# Patient Record
Sex: FEMALE | Race: WHITE | NOT HISPANIC OR LATINO | Employment: FULL TIME | URBAN - METROPOLITAN AREA
[De-identification: names, ages, dates, MRNs, and addresses within clinical notes are randomized per-mention and may not be internally consistent; named-entity substitution may affect disease eponyms.]

---

## 2023-06-16 PROBLEM — B35.1 ONYCHOMYCOSIS: Status: ACTIVE | Noted: 2023-06-16

## 2023-06-16 PROBLEM — S82.143A FRACTURE, TIBIAL PLATEAU: Status: ACTIVE | Noted: 2023-06-16

## 2023-06-16 PROBLEM — B34.9 VIRAL SYNDROME: Status: ACTIVE | Noted: 2023-06-16

## 2023-06-16 PROBLEM — M17.10 ARTHRITIS OF KNEE: Status: ACTIVE | Noted: 2023-06-16

## 2023-06-16 PROBLEM — M25.569 KNEE PAIN: Status: ACTIVE | Noted: 2023-06-16

## 2023-06-16 PROBLEM — K86.89 PANCREATIC MASS (HHS-HCC): Status: ACTIVE | Noted: 2023-06-16

## 2023-06-16 PROBLEM — N95.2 ATROPHIC VAGINITIS: Status: ACTIVE | Noted: 2023-06-16

## 2023-06-16 PROBLEM — R35.0 URINARY FREQUENCY: Status: ACTIVE | Noted: 2023-06-16

## 2023-06-16 PROBLEM — R93.89 ENDOMETRIAL THICKENING ON ULTRASOUND: Status: ACTIVE | Noted: 2023-06-16

## 2023-06-16 PROBLEM — J20.9 ACUTE BRONCHITIS: Status: RESOLVED | Noted: 2023-06-16 | Resolved: 2023-06-16

## 2023-06-16 PROBLEM — R05.9 COUGH: Status: ACTIVE | Noted: 2023-06-16

## 2023-06-16 PROBLEM — M70.70 BURSITIS OF HIP: Status: ACTIVE | Noted: 2023-06-16

## 2023-06-16 PROBLEM — F32.A DEPRESSION: Status: ACTIVE | Noted: 2023-06-16

## 2023-06-16 PROBLEM — G47.00 INSOMNIA: Status: ACTIVE | Noted: 2023-06-16

## 2023-06-16 PROBLEM — R10.9 ABDOMINAL PAIN: Status: ACTIVE | Noted: 2023-06-16

## 2023-06-16 PROBLEM — M25.512 SHOULDER PAIN, LEFT: Status: ACTIVE | Noted: 2023-06-16

## 2023-06-16 PROBLEM — J02.9 PHARYNGITIS, ACUTE: Status: RESOLVED | Noted: 2023-06-16 | Resolved: 2023-06-16

## 2023-06-16 PROBLEM — N39.0 ACUTE UTI: Status: RESOLVED | Noted: 2023-06-16 | Resolved: 2023-06-16

## 2023-06-16 PROBLEM — J06.9 ACUTE URI: Status: RESOLVED | Noted: 2023-06-16 | Resolved: 2023-06-16

## 2023-06-16 PROBLEM — H10.30 ACUTE CONJUNCTIVITIS: Status: RESOLVED | Noted: 2023-06-16 | Resolved: 2023-06-16

## 2023-06-16 PROBLEM — F41.9 ANXIETY: Status: ACTIVE | Noted: 2023-06-16

## 2023-06-16 PROBLEM — S83.207A TEAR OF MENISCUS OF LEFT KNEE: Status: ACTIVE | Noted: 2023-06-16

## 2023-06-16 PROBLEM — B35.3 TINEA PEDIS OF BOTH FEET: Status: ACTIVE | Noted: 2023-06-16

## 2023-06-16 RX ORDER — DICLOFENAC SODIUM 75 MG/1
TABLET, DELAYED RELEASE ORAL
COMMUNITY
Start: 2022-04-14 | End: 2023-07-21 | Stop reason: SDUPTHER

## 2023-06-16 RX ORDER — DULOXETIN HYDROCHLORIDE 20 MG/1
1 CAPSULE, DELAYED RELEASE ORAL 2 TIMES DAILY
COMMUNITY
Start: 2022-04-14 | End: 2023-06-19 | Stop reason: WASHOUT

## 2023-06-16 RX ORDER — FLUTICASONE PROPIONATE 50 MCG
2 SPRAY, SUSPENSION (ML) NASAL DAILY
COMMUNITY
Start: 2016-02-15 | End: 2023-06-19 | Stop reason: WASHOUT

## 2023-06-16 RX ORDER — ESCITALOPRAM OXALATE 20 MG/1
1 TABLET ORAL DAILY
COMMUNITY
Start: 2021-03-24 | End: 2023-06-19 | Stop reason: WASHOUT

## 2023-06-16 RX ORDER — CICLOPIROX 80 MG/ML
SOLUTION TOPICAL 2 TIMES DAILY
COMMUNITY
Start: 2017-04-04 | End: 2023-06-19 | Stop reason: WASHOUT

## 2023-06-16 RX ORDER — NAPROXEN 500 MG/1
TABLET ORAL EVERY 12 HOURS
COMMUNITY
Start: 2021-02-10 | End: 2023-06-19 | Stop reason: WASHOUT

## 2023-06-16 RX ORDER — TRAZODONE HYDROCHLORIDE 100 MG/1
1 TABLET ORAL NIGHTLY
COMMUNITY
Start: 2021-03-24 | End: 2023-07-21 | Stop reason: SDUPTHER

## 2023-06-16 RX ORDER — ALBUTEROL SULFATE 90 UG/1
2 AEROSOL, METERED RESPIRATORY (INHALATION) EVERY 4 HOURS PRN
COMMUNITY
Start: 2016-02-15 | End: 2023-06-19 | Stop reason: WASHOUT

## 2023-06-19 ENCOUNTER — OFFICE VISIT (OUTPATIENT)
Dept: PRIMARY CARE | Facility: CLINIC | Age: 63
End: 2023-06-19
Payer: COMMERCIAL

## 2023-06-19 ENCOUNTER — LAB (OUTPATIENT)
Dept: LAB | Facility: LAB | Age: 63
End: 2023-06-19
Payer: COMMERCIAL

## 2023-06-19 VITALS
WEIGHT: 128.4 LBS | HEART RATE: 75 BPM | TEMPERATURE: 95.2 F | HEIGHT: 62 IN | DIASTOLIC BLOOD PRESSURE: 72 MMHG | SYSTOLIC BLOOD PRESSURE: 120 MMHG | OXYGEN SATURATION: 97 % | BODY MASS INDEX: 23.63 KG/M2

## 2023-06-19 DIAGNOSIS — Z23 NEED FOR TETANUS BOOSTER: ICD-10-CM

## 2023-06-19 DIAGNOSIS — E55.9 VITAMIN D DEFICIENCY: ICD-10-CM

## 2023-06-19 DIAGNOSIS — Z78.0 ASYMPTOMATIC MENOPAUSAL STATE: ICD-10-CM

## 2023-06-19 DIAGNOSIS — Z11.59 NEED FOR HEPATITIS C SCREENING TEST: ICD-10-CM

## 2023-06-19 DIAGNOSIS — Z12.31 BREAST CANCER SCREENING BY MAMMOGRAM: ICD-10-CM

## 2023-06-19 DIAGNOSIS — Z11.4 ENCOUNTER FOR SCREENING FOR HIV: ICD-10-CM

## 2023-06-19 DIAGNOSIS — Z00.00 ENCOUNTER FOR ANNUAL PHYSICAL EXAM: Primary | ICD-10-CM

## 2023-06-19 LAB
CALCIDIOL (25 OH VITAMIN D3) (NG/ML) IN SER/PLAS: 53 NG/ML
HEPATITIS C VIRUS AB PRESENCE IN SERUM: NONREACTIVE
HIV 1/ 2 AG/AB SCREEN: NONREACTIVE

## 2023-06-19 PROCEDURE — 36415 COLL VENOUS BLD VENIPUNCTURE: CPT

## 2023-06-19 PROCEDURE — 86803 HEPATITIS C AB TEST: CPT

## 2023-06-19 PROCEDURE — 90471 IMMUNIZATION ADMIN: CPT | Performed by: INTERNAL MEDICINE

## 2023-06-19 PROCEDURE — 82306 VITAMIN D 25 HYDROXY: CPT

## 2023-06-19 PROCEDURE — 87389 HIV-1 AG W/HIV-1&-2 AB AG IA: CPT

## 2023-06-19 PROCEDURE — 99396 PREV VISIT EST AGE 40-64: CPT | Performed by: INTERNAL MEDICINE

## 2023-06-19 PROCEDURE — 90715 TDAP VACCINE 7 YRS/> IM: CPT | Performed by: INTERNAL MEDICINE

## 2023-06-19 ASSESSMENT — ENCOUNTER SYMPTOMS
HEADACHES: 0
NECK PAIN: 0
ADENOPATHY: 0
POLYPHAGIA: 0
ACTIVITY CHANGE: 0
NAUSEA: 0
VOMITING: 0
ARTHRALGIAS: 1
SHORTNESS OF BREATH: 0
DYSURIA: 0
FEVER: 0
APPETITE CHANGE: 0
EYE PAIN: 0
CHEST TIGHTNESS: 0
UNEXPECTED WEIGHT CHANGE: 0
STRIDOR: 0
BLOOD IN STOOL: 0
WOUND: 0
WEAKNESS: 0
SORE THROAT: 0
NUMBNESS: 0
PALPITATIONS: 0
FACIAL ASYMMETRY: 0
TROUBLE SWALLOWING: 0
FATIGUE: 0
DIARRHEA: 0
PHOTOPHOBIA: 0
FLANK PAIN: 0
ABDOMINAL PAIN: 0
CONFUSION: 0
SPEECH DIFFICULTY: 0
DIZZINESS: 0
POLYDIPSIA: 0
HALLUCINATIONS: 0
BACK PAIN: 0
CONSTIPATION: 0
WHEEZING: 0
SINUS PAIN: 0
SEIZURES: 0
SLEEP DISTURBANCE: 0
NERVOUS/ANXIOUS: 0
MYALGIAS: 0
VOICE CHANGE: 0

## 2023-06-19 ASSESSMENT — PATIENT HEALTH QUESTIONNAIRE - PHQ9
1. LITTLE INTEREST OR PLEASURE IN DOING THINGS: NOT AT ALL
2. FEELING DOWN, DEPRESSED OR HOPELESS: NOT AT ALL
SUM OF ALL RESPONSES TO PHQ9 QUESTIONS 1 AND 2: 0

## 2023-06-19 ASSESSMENT — PAIN SCALES - GENERAL: PAINLEVEL: 4

## 2023-06-19 NOTE — PROGRESS NOTES
"Subjective   Patient ID: Edna Clarke is a 62 y.o. female who presents for Follow-up.    HPI   She is taking Trazodone for Insomnia and its working good and denied for any side effects.   She is also taking Voltaren as needed for pain.    Review of Systems   Constitutional:  Negative for activity change, appetite change, fatigue, fever and unexpected weight change.   HENT:  Negative for dental problem, ear discharge, hearing loss, nosebleeds, postnasal drip, sinus pain, sore throat, trouble swallowing and voice change.    Eyes:  Negative for photophobia, pain and visual disturbance.   Respiratory:  Negative for chest tightness, shortness of breath, wheezing and stridor.    Cardiovascular:  Negative for chest pain, palpitations and leg swelling.   Gastrointestinal:  Negative for abdominal pain, blood in stool, constipation, diarrhea, nausea and vomiting.   Endocrine: Negative for polydipsia, polyphagia and polyuria.   Genitourinary:  Negative for decreased urine volume, dyspareunia, dysuria, flank pain and urgency.   Musculoskeletal:  Positive for arthralgias. Negative for back pain, gait problem, myalgias and neck pain.   Skin:  Negative for rash and wound.   Allergic/Immunologic: Negative for environmental allergies and food allergies.   Neurological:  Negative for dizziness, seizures, syncope, facial asymmetry, speech difficulty, weakness, numbness and headaches.   Hematological:  Negative for adenopathy.   Psychiatric/Behavioral:  Negative for behavioral problems, confusion, hallucinations, sleep disturbance and suicidal ideas. The patient is not nervous/anxious.      Objective   /72   Pulse 75   Temp 35.1 °C (95.2 °F)   Ht 1.575 m (5' 2\")   Wt 58.2 kg (128 lb 6.4 oz)   SpO2 97%   BMI 23.48 kg/m²     Physical Exam  Constitutional:       General: She is not in acute distress.     Appearance: Normal appearance. She is not ill-appearing or toxic-appearing.   HENT:      Head: Normocephalic and " atraumatic.      Nose: Nose normal.   Eyes:      General:         Right eye: No discharge.         Left eye: No discharge.      Conjunctiva/sclera: Conjunctivae normal.      Pupils: Pupils are equal, round, and reactive to light.   Neck:      Vascular: No carotid bruit.   Cardiovascular:      Rate and Rhythm: Normal rate and regular rhythm.      Pulses: Normal pulses.      Heart sounds: Normal heart sounds. No murmur heard.     No gallop.   Pulmonary:      Effort: Pulmonary effort is normal. No respiratory distress.      Breath sounds: Normal breath sounds. No stridor. No wheezing or rales.   Abdominal:      General: Bowel sounds are normal. There is no distension.      Palpations: Abdomen is soft.      Tenderness: There is no abdominal tenderness. There is no right CVA tenderness, left CVA tenderness, guarding or rebound.   Musculoskeletal:         General: No swelling or deformity. Normal range of motion.      Cervical back: Normal range of motion and neck supple. No rigidity or tenderness.      Right lower leg: No edema.      Left lower leg: No edema.   Lymphadenopathy:      Cervical: No cervical adenopathy.   Skin:     General: Skin is warm.      Coloration: Skin is not jaundiced.      Findings: No bruising, erythema or rash.   Neurological:      General: No focal deficit present.      Mental Status: She is alert and oriented to person, place, and time.      Cranial Nerves: No cranial nerve deficit.      Gait: Gait normal.   Psychiatric:         Mood and Affect: Mood normal.         Behavior: Behavior normal.         Thought Content: Thought content normal.         Judgment: Judgment normal.       Assessment/Plan   Problem List Items Addressed This Visit    None  Visit Diagnoses       Encounter for annual physical exam    -  Primary    Vitamin D deficiency        Relevant Orders    Vitamin D, Total    Need for hepatitis C screening test        Relevant Orders    Hepatitis C Antibody    Encounter for screening  for HIV        Relevant Orders    HIV 1/2 Antigen/Antibody Screen with Reflex to Confirmation    Asymptomatic menopausal state        Relevant Orders    XR DEXA bone density    Breast cancer screening by mammogram        Relevant Orders    BI mammo bilateral screening tomosynthesis    Need for tetanus booster        Relevant Orders    Tdap vaccine, age 10 years and older (ADACEL)          RTC in 1 year for regular follow up.

## 2023-07-21 DIAGNOSIS — G47.09 OTHER INSOMNIA: ICD-10-CM

## 2023-07-21 DIAGNOSIS — M17.10 ARTHRITIS OF KNEE: ICD-10-CM

## 2023-07-21 RX ORDER — TRAZODONE HYDROCHLORIDE 100 MG/1
100 TABLET ORAL NIGHTLY
Qty: 30 TABLET | Refills: 1 | Status: SHIPPED | OUTPATIENT
Start: 2023-07-21 | End: 2023-07-25

## 2023-07-21 RX ORDER — DICLOFENAC SODIUM 75 MG/1
75 TABLET, DELAYED RELEASE ORAL 2 TIMES DAILY
Qty: 30 TABLET | Refills: 3 | Status: SHIPPED | OUTPATIENT
Start: 2023-07-21 | End: 2023-07-25 | Stop reason: SDUPTHER

## 2023-07-25 DIAGNOSIS — G47.09 OTHER INSOMNIA: ICD-10-CM

## 2023-07-25 DIAGNOSIS — M17.10 ARTHRITIS OF KNEE: ICD-10-CM

## 2023-07-25 RX ORDER — TRAZODONE HYDROCHLORIDE 100 MG/1
100 TABLET ORAL NIGHTLY
Qty: 90 TABLET | Refills: 3 | Status: SHIPPED | OUTPATIENT
Start: 2023-07-25 | End: 2023-09-22

## 2023-07-25 RX ORDER — DICLOFENAC SODIUM 75 MG/1
75 TABLET, DELAYED RELEASE ORAL 2 TIMES DAILY
Qty: 180 TABLET | Refills: 3 | Status: SHIPPED | OUTPATIENT
Start: 2023-07-25

## 2023-09-22 DIAGNOSIS — G47.09 OTHER INSOMNIA: ICD-10-CM

## 2023-09-22 RX ORDER — TRAZODONE HYDROCHLORIDE 100 MG/1
100 TABLET ORAL NIGHTLY
Qty: 30 TABLET | Refills: 0 | Status: SHIPPED | OUTPATIENT
Start: 2023-09-22 | End: 2023-10-24 | Stop reason: SDUPTHER

## 2023-10-24 DIAGNOSIS — G47.09 OTHER INSOMNIA: ICD-10-CM

## 2023-10-25 RX ORDER — TRAZODONE HYDROCHLORIDE 100 MG/1
100 TABLET ORAL NIGHTLY
Qty: 30 TABLET | Refills: 0 | Status: SHIPPED | OUTPATIENT
Start: 2023-10-25

## 2024-06-24 ENCOUNTER — APPOINTMENT (OUTPATIENT)
Dept: PRIMARY CARE | Facility: CLINIC | Age: 64
End: 2024-06-24
Payer: COMMERCIAL

## 2024-06-24 VITALS
TEMPERATURE: 97.5 F | DIASTOLIC BLOOD PRESSURE: 88 MMHG | HEIGHT: 62 IN | OXYGEN SATURATION: 99 % | HEART RATE: 73 BPM | BODY MASS INDEX: 23 KG/M2 | WEIGHT: 125 LBS | SYSTOLIC BLOOD PRESSURE: 138 MMHG

## 2024-06-24 DIAGNOSIS — Z00.00 ENCOUNTER FOR ANNUAL PHYSICAL EXAM: Primary | ICD-10-CM

## 2024-06-24 DIAGNOSIS — Z12.31 BREAST CANCER SCREENING BY MAMMOGRAM: ICD-10-CM

## 2024-06-24 DIAGNOSIS — G47.09 OTHER INSOMNIA: ICD-10-CM

## 2024-06-24 PROCEDURE — 99396 PREV VISIT EST AGE 40-64: CPT | Performed by: INTERNAL MEDICINE

## 2024-06-24 PROCEDURE — 4004F PT TOBACCO SCREEN RCVD TLK: CPT | Performed by: INTERNAL MEDICINE

## 2024-06-24 RX ORDER — TRAZODONE HYDROCHLORIDE 100 MG/1
100 TABLET ORAL NIGHTLY
Qty: 90 TABLET | Refills: 3 | Status: SHIPPED | OUTPATIENT
Start: 2024-06-24 | End: 2025-06-24

## 2024-06-24 ASSESSMENT — ENCOUNTER SYMPTOMS
BACK PAIN: 0
APPETITE CHANGE: 0
UNEXPECTED WEIGHT CHANGE: 0
SINUS PAIN: 0
DIZZINESS: 0
PALPITATIONS: 0
CHEST TIGHTNESS: 0
NECK PAIN: 0
WHEEZING: 0
SLEEP DISTURBANCE: 0
HALLUCINATIONS: 0
HEMATURIA: 0
DIARRHEA: 0
SHORTNESS OF BREATH: 0
SEIZURES: 0
COUGH: 0
STRIDOR: 0
HEADACHES: 0
COLOR CHANGE: 0
WEAKNESS: 0
BLOOD IN STOOL: 0
FLANK PAIN: 0
EYE PAIN: 0
NUMBNESS: 0
FATIGUE: 0
SPEECH DIFFICULTY: 0
PHOTOPHOBIA: 0
NAUSEA: 0
FEVER: 0
VOICE CHANGE: 0
NERVOUS/ANXIOUS: 0
CONFUSION: 0
POLYPHAGIA: 0
CONSTIPATION: 0
ARTHRALGIAS: 0
TROUBLE SWALLOWING: 0
DYSURIA: 0
POLYDIPSIA: 0
VOMITING: 0
JOINT SWELLING: 0
ACTIVITY CHANGE: 0
ABDOMINAL PAIN: 0
SORE THROAT: 0
WOUND: 0
TREMORS: 0
ADENOPATHY: 0
FACIAL ASYMMETRY: 0
MYALGIAS: 0
FREQUENCY: 0

## 2024-06-24 ASSESSMENT — ANXIETY QUESTIONNAIRES
4. TROUBLE RELAXING: NOT AT ALL
7. FEELING AFRAID AS IF SOMETHING AWFUL MIGHT HAPPEN: NOT AT ALL
1. FEELING NERVOUS, ANXIOUS, OR ON EDGE: NOT AT ALL
3. WORRYING TOO MUCH ABOUT DIFFERENT THINGS: NOT AT ALL
5. BEING SO RESTLESS THAT IT IS HARD TO SIT STILL: NOT AT ALL
GAD7 TOTAL SCORE: 0
2. NOT BEING ABLE TO STOP OR CONTROL WORRYING: NOT AT ALL
6. BECOMING EASILY ANNOYED OR IRRITABLE: NOT AT ALL

## 2024-06-24 NOTE — PROGRESS NOTES
"Subjective   Patient ID: Edna Clarke is a 63 y.o. female who presents for Annual Exam.    HPI   Patient presented to the office for annual physical exam. Trazodone is working well for insomnia and patient need refill on the medicine.  She denied for any symptoms today.    Review of Systems   Constitutional:  Negative for activity change, appetite change, fatigue, fever and unexpected weight change.   HENT:  Negative for dental problem, ear discharge, hearing loss, nosebleeds, postnasal drip, sinus pain, sore throat, trouble swallowing and voice change.    Eyes:  Negative for photophobia, pain and visual disturbance.   Respiratory:  Negative for cough, chest tightness, shortness of breath, wheezing and stridor.    Cardiovascular:  Negative for chest pain, palpitations and leg swelling.   Gastrointestinal:  Negative for abdominal pain, blood in stool, constipation, diarrhea, nausea and vomiting.   Endocrine: Negative for polydipsia, polyphagia and polyuria.   Genitourinary:  Negative for decreased urine volume, dyspareunia, dysuria, flank pain, frequency, hematuria and urgency.   Musculoskeletal:  Negative for arthralgias, back pain, gait problem, joint swelling, myalgias and neck pain.   Skin:  Negative for color change, rash and wound.   Allergic/Immunologic: Negative for environmental allergies and food allergies.   Neurological:  Negative for dizziness, tremors, seizures, syncope, facial asymmetry, speech difficulty, weakness, numbness and headaches.   Hematological:  Negative for adenopathy.   Psychiatric/Behavioral:  Negative for behavioral problems, confusion, hallucinations, self-injury, sleep disturbance and suicidal ideas. The patient is not nervous/anxious.      Objective   /88   Pulse 73   Temp 36.4 °C (97.5 °F)   Ht 1.575 m (5' 2\")   Wt 56.7 kg (125 lb)   SpO2 99%   BMI 22.86 kg/m²     Physical Exam  Vitals and nursing note reviewed.   Constitutional:       General: She is not in " acute distress.     Appearance: Normal appearance. She is not ill-appearing or toxic-appearing.   HENT:      Head: Normocephalic and atraumatic.      Nose: Nose normal.   Eyes:      General:         Right eye: No discharge.         Left eye: No discharge.      Extraocular Movements: Extraocular movements intact.      Conjunctiva/sclera: Conjunctivae normal.      Pupils: Pupils are equal, round, and reactive to light.   Cardiovascular:      Rate and Rhythm: Normal rate and regular rhythm.      Pulses: Normal pulses.      Heart sounds: Normal heart sounds. No murmur heard.     No gallop.   Pulmonary:      Effort: Pulmonary effort is normal. No respiratory distress.      Breath sounds: Normal breath sounds. No stridor. No wheezing or rales.   Abdominal:      General: Bowel sounds are normal. There is no distension.      Palpations: Abdomen is soft.      Tenderness: There is no abdominal tenderness. There is no right CVA tenderness, left CVA tenderness, guarding or rebound.   Musculoskeletal:         General: No swelling or deformity. Normal range of motion.      Cervical back: Normal range of motion and neck supple. No rigidity or tenderness.      Right lower leg: No edema.      Left lower leg: No edema.   Skin:     General: Skin is warm.      Coloration: Skin is not jaundiced.      Findings: No bruising, erythema or rash.   Neurological:      General: No focal deficit present.      Mental Status: She is alert and oriented to person, place, and time.      Cranial Nerves: No cranial nerve deficit.      Gait: Gait normal.   Psychiatric:         Mood and Affect: Mood normal.         Behavior: Behavior normal.         Thought Content: Thought content normal.         Judgment: Judgment normal.       Assessment/Plan   Problem List Items Addressed This Visit          Sleep    Insomnia    Relevant Medications    traZODone (Desyrel) 100 mg tablet     Other Visit Diagnoses       Encounter for annual physical exam    -  Primary     Breast cancer screening by mammogram        Relevant Orders    BI mammo bilateral screening tomosynthesis

## 2024-07-31 ENCOUNTER — HOSPITAL ENCOUNTER (OUTPATIENT)
Dept: RADIOLOGY | Facility: HOSPITAL | Age: 64
Discharge: HOME | End: 2024-07-31
Payer: COMMERCIAL

## 2024-07-31 VITALS — BODY MASS INDEX: 21.44 KG/M2 | HEIGHT: 63 IN | WEIGHT: 121 LBS

## 2024-07-31 DIAGNOSIS — Z12.31 BREAST CANCER SCREENING BY MAMMOGRAM: ICD-10-CM

## 2024-07-31 PROCEDURE — 77067 SCR MAMMO BI INCL CAD: CPT | Performed by: RADIOLOGY

## 2024-07-31 PROCEDURE — 77063 BREAST TOMOSYNTHESIS BI: CPT | Performed by: RADIOLOGY

## 2024-07-31 PROCEDURE — 77067 SCR MAMMO BI INCL CAD: CPT

## 2024-08-07 ENCOUNTER — APPOINTMENT (OUTPATIENT)
Dept: RADIOLOGY | Facility: HOSPITAL | Age: 64
End: 2024-08-07
Payer: COMMERCIAL

## 2024-08-10 DIAGNOSIS — M17.10 ARTHRITIS OF KNEE: ICD-10-CM

## 2024-08-13 RX ORDER — DICLOFENAC SODIUM 75 MG/1
75 TABLET, DELAYED RELEASE ORAL 2 TIMES DAILY
Qty: 180 TABLET | Refills: 0 | Status: SHIPPED | OUTPATIENT
Start: 2024-08-13 | End: 2024-08-14

## 2024-08-14 DIAGNOSIS — M17.10 ARTHRITIS OF KNEE: ICD-10-CM

## 2024-08-14 RX ORDER — DICLOFENAC SODIUM 75 MG/1
75 TABLET, DELAYED RELEASE ORAL 2 TIMES DAILY
Qty: 180 TABLET | Refills: 0 | Status: SHIPPED | OUTPATIENT
Start: 2024-08-14

## 2024-08-15 ENCOUNTER — APPOINTMENT (OUTPATIENT)
Dept: RADIOLOGY | Facility: HOSPITAL | Age: 64
End: 2024-08-15
Payer: COMMERCIAL

## 2024-08-19 ENCOUNTER — HOSPITAL ENCOUNTER (OUTPATIENT)
Dept: RADIOLOGY | Facility: CLINIC | Age: 64
Discharge: HOME | End: 2024-08-19
Payer: COMMERCIAL

## 2024-08-19 DIAGNOSIS — S80.11XA CONTUSION OF KNEE AND LOWER LEG, RIGHT, INITIAL ENCOUNTER: ICD-10-CM

## 2024-08-19 DIAGNOSIS — S80.01XA CONTUSION OF KNEE AND LOWER LEG, RIGHT, INITIAL ENCOUNTER: ICD-10-CM

## 2024-08-19 PROCEDURE — 73564 X-RAY EXAM KNEE 4 OR MORE: CPT | Mod: RIGHT SIDE | Performed by: RADIOLOGY

## 2024-08-19 PROCEDURE — 73564 X-RAY EXAM KNEE 4 OR MORE: CPT | Mod: RT

## 2024-08-22 ENCOUNTER — OFFICE VISIT (OUTPATIENT)
Dept: PRIMARY CARE | Facility: CLINIC | Age: 64
End: 2024-08-22
Payer: COMMERCIAL

## 2024-08-22 VITALS
HEART RATE: 91 BPM | OXYGEN SATURATION: 98 % | SYSTOLIC BLOOD PRESSURE: 104 MMHG | DIASTOLIC BLOOD PRESSURE: 68 MMHG | WEIGHT: 121 LBS | HEIGHT: 62 IN | TEMPERATURE: 98.2 F | BODY MASS INDEX: 22.26 KG/M2

## 2024-08-22 DIAGNOSIS — M89.50 OSTEOLYTIC LESION: Primary | ICD-10-CM

## 2024-08-22 DIAGNOSIS — M25.561 ACUTE PAIN OF RIGHT KNEE: ICD-10-CM

## 2024-08-22 PROCEDURE — 99213 OFFICE O/P EST LOW 20 MIN: CPT | Performed by: INTERNAL MEDICINE

## 2024-08-22 PROCEDURE — 3008F BODY MASS INDEX DOCD: CPT | Performed by: INTERNAL MEDICINE

## 2024-08-22 RX ORDER — TRAMADOL HYDROCHLORIDE 50 MG/1
50 TABLET ORAL EVERY 8 HOURS
Qty: 15 TABLET | Refills: 0 | Status: SHIPPED | OUTPATIENT
Start: 2024-08-22 | End: 2024-08-27

## 2024-08-22 ASSESSMENT — ENCOUNTER SYMPTOMS
VOMITING: 0
HALLUCINATIONS: 0
SORE THROAT: 0
UNEXPECTED WEIGHT CHANGE: 0
CONFUSION: 0
SINUS PAIN: 0
POLYPHAGIA: 0
ARTHRALGIAS: 1
WHEEZING: 0
WOUND: 0
DYSURIA: 0
CONSTIPATION: 0
NUMBNESS: 0
DIZZINESS: 0
NAUSEA: 0
FATIGUE: 0
ADENOPATHY: 0
BACK PAIN: 0
FLANK PAIN: 0
ABDOMINAL PAIN: 0
APPETITE CHANGE: 0
FEVER: 0
BLOOD IN STOOL: 0
HEMATURIA: 0
DIARRHEA: 0
PALPITATIONS: 0
JOINT SWELLING: 1
SLEEP DISTURBANCE: 0
STRIDOR: 0
WEAKNESS: 0
MYALGIAS: 0
SHORTNESS OF BREATH: 0
TREMORS: 0
POLYDIPSIA: 0
COLOR CHANGE: 0
NECK PAIN: 0
COUGH: 0
HEADACHES: 0
NERVOUS/ANXIOUS: 0
ACTIVITY CHANGE: 0

## 2024-08-22 NOTE — PROGRESS NOTES
"Subjective   Patient ID: Edna Clarke is a 63 y.o. female who presents for Leg Injury (Hit leg last Thursday, had xray, needing mri ).    HPI   Patient went to urgent care for acute right knee pain and x ray revealed osteolytic lesion and she is going to need MRI of right knee. She still has pain and is taking NSAIDs which helps little. She was given tramadol and that improved her symptoms most.  She also want to see an orthopedics.    Review of Systems   Constitutional:  Negative for activity change, appetite change, fatigue, fever and unexpected weight change.   HENT:  Negative for dental problem, ear discharge, hearing loss, nosebleeds, postnasal drip, sinus pain and sore throat.    Eyes:  Negative for visual disturbance.   Respiratory:  Negative for cough, shortness of breath, wheezing and stridor.    Cardiovascular:  Negative for chest pain, palpitations and leg swelling.   Gastrointestinal:  Negative for abdominal pain, blood in stool, constipation, diarrhea, nausea and vomiting.   Endocrine: Negative for polydipsia, polyphagia and polyuria.   Genitourinary:  Negative for decreased urine volume, dyspareunia, dysuria, flank pain, hematuria and urgency.   Musculoskeletal:  Positive for arthralgias and joint swelling. Negative for back pain, gait problem, myalgias and neck pain.   Skin:  Negative for color change, rash and wound.   Allergic/Immunologic: Negative for environmental allergies and food allergies.   Neurological:  Negative for dizziness, tremors, weakness, numbness and headaches.   Hematological:  Negative for adenopathy.   Psychiatric/Behavioral:  Negative for behavioral problems, confusion, hallucinations and sleep disturbance. The patient is not nervous/anxious.      Objective   /68   Pulse 91   Temp 36.8 °C (98.2 °F)   Ht 1.575 m (5' 2\")   Wt 54.9 kg (121 lb)   SpO2 98%   BMI 22.13 kg/m²     Physical Exam  Constitutional:       General: She is not in acute distress.     " Appearance: She is not ill-appearing or toxic-appearing.   HENT:      Nose: Nose normal.   Eyes:      General:         Right eye: No discharge.         Left eye: No discharge.      Conjunctiva/sclera: Conjunctivae normal.   Pulmonary:      Effort: Pulmonary effort is normal.   Musculoskeletal:         General: Swelling and tenderness present. No deformity.      Cervical back: Normal range of motion.      Right lower leg: No edema.      Left lower leg: No edema.   Skin:     General: Skin is warm.      Coloration: Skin is not jaundiced.      Findings: No erythema or rash.   Neurological:      General: No focal deficit present.      Mental Status: She is alert and oriented to person, place, and time.   Psychiatric:         Mood and Affect: Mood normal.         Behavior: Behavior normal.       Assessment/Plan   Problem List Items Addressed This Visit       Knee pain    Relevant Medications    traMADol (Ultram) 50 mg tablet    Other Relevant Orders    MR knee right wo IV contrast    Referral to Orthopaedic Surgery     Other Visit Diagnoses       Osteolytic lesion    -  Primary    Relevant Orders    MR knee right wo IV contrast    Referral to Orthopaedic Surgery

## 2024-08-23 ENCOUNTER — APPOINTMENT (OUTPATIENT)
Dept: RADIOLOGY | Facility: HOSPITAL | Age: 64
End: 2024-08-23
Payer: COMMERCIAL

## 2024-08-27 ENCOUNTER — APPOINTMENT (OUTPATIENT)
Dept: ORTHOPEDIC SURGERY | Facility: CLINIC | Age: 64
End: 2024-08-27
Payer: COMMERCIAL

## 2024-08-27 DIAGNOSIS — M25.561 ACUTE PAIN OF RIGHT KNEE: ICD-10-CM

## 2024-08-27 DIAGNOSIS — M89.50 OSTEOLYTIC LESION: ICD-10-CM

## 2024-08-27 PROCEDURE — 4004F PT TOBACCO SCREEN RCVD TLK: CPT | Performed by: STUDENT IN AN ORGANIZED HEALTH CARE EDUCATION/TRAINING PROGRAM

## 2024-08-27 PROCEDURE — 99203 OFFICE O/P NEW LOW 30 MIN: CPT | Performed by: STUDENT IN AN ORGANIZED HEALTH CARE EDUCATION/TRAINING PROGRAM

## 2024-08-27 NOTE — PROGRESS NOTES
Edna Clarke  is a 63 y.o. year-old  female. she  is a new patient to our office and presents with a chief complaint of Right  knee pain.  She notes a few weeks ago she sort of hit the knee on something on the lateral aspect and her knee went in and she has been having pain ever since.  She got an x-ray in the ER concerning for lytic lesion of her distal femur and has since had an MRI.  She reports no fevers or chills she is not necessarily having pain with weightbearing but when she twists she feels pain.      Past Medical, Family, and Social History reviewed   Review of Systems  A complete review of systems was conducted, pertinent only to the HPI noted above.  Constitutional: None  Eyes: No additions to above history  Ears, Nose, Throat: No additions to above history  Cardiovascular: No additions to above history  Respiratory: No additions to above history  GI: No additions to above history  : No additions to above history  Skin/Neuro: No additions to above history  Endocrine/Heme/Lymph: No additions to above history  Immunologic: No additions to above history  Psychiatric: No additions to above history  Musculoskeletal: see above    GEN: Alert and Oriented x 3  Constitutional: Well appearing , in no apparent distress.  Skin: No rashes, erythema, or induration around knee    MUSCULOSKELETAL EXAM:     Right KNEE:  ROM: 0/0/130  Effusion: negative  Alignment: [neutral]      Gait: [normal]  Sensation intact bilaterally sural/saphenous/sp/dp/tibial nerve bilaterally  Motor 5/5 knee flexion/extension/foot DF/PF/EHL/FHL bilaterally  Palpable/symmetric DP and PT pulse bilaterally      PATELLAR/EXTENSOR MECHANISM:   KNEE:  Patellar Crepitus: n  Patellar Compression Pain:n  Patellar Apprehension: [no]  Extensor Mechanism: [intact]  Straight Leg Raise: good      LIGAMENTS:  ACL: Lachmans: [negative]  PCL: [stable]  Valgus at 0 degrees: [stable]  Valgus at 30 degrees: [stable]  Varus at 0 degrees: [stable]  Varus  at 30 degrees: [stable]    MENISCUS EXAM:  Joint Line Tenderness: There is a little bit of tenderness over her medial femoral condyle as well as over lateral proximal tibia  McMurrays: [negative]  Pain with Deep Flexion: [No]    Xrays and MRI independently viewed and interpreted: Lytic lesion of the distal medial femoral condyle on the MRI there does not appear to be a cortical breach but does appear to thin out in some areas also edema in the proximal tibia concerning for a potential nondisplaced tibia fracture there was no MRI report for review    I did review with the patient the lytic lesion that I see in her femur on both the x-rays and the MRI.  I reviewed with her I am not sure what this may represent.  Given the involvement of the distal metaphyseal bone and its abutment to and thinning of the cortical surfaces I have recommended limiting her weightbearing and utilizing crutches to minimize the risk for fracture.  We have referred her to my partner Dr. Lamb who is a musculoskeletal oncologist for further evaluation of this lesion.  All questions were answered she is in agreement with this plan.

## 2024-08-30 ENCOUNTER — APPOINTMENT (OUTPATIENT)
Dept: ORTHOPEDIC SURGERY | Facility: CLINIC | Age: 64
End: 2024-08-30
Payer: COMMERCIAL

## 2024-09-03 ENCOUNTER — DOCUMENTATION (OUTPATIENT)
Dept: ORTHOPEDIC SURGERY | Facility: CLINIC | Age: 64
End: 2024-09-03
Payer: COMMERCIAL

## 2024-09-03 NOTE — PROGRESS NOTES
Reviewed the MRI report.  Confirms a nondisplaced posterior tibial plateau fracture.  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  Again relayed to her that she should be on crutches to give this some time to heal.  She also has a brace at home but it is not fitting her properly.  She is can to come into the office to see if we can get this fitting better.  I would still recommend her following up with Dr. Lamb with regards to her bone lesion.  Questions answered she is in agreement with this plan

## 2024-09-04 ENCOUNTER — APPOINTMENT (OUTPATIENT)
Dept: RADIOLOGY | Facility: HOSPITAL | Age: 64
End: 2024-09-04
Payer: COMMERCIAL

## 2024-09-04 ENCOUNTER — HOSPITAL ENCOUNTER (OUTPATIENT)
Dept: RADIOLOGY | Facility: HOSPITAL | Age: 64
Discharge: HOME | End: 2024-09-04
Payer: COMMERCIAL

## 2024-09-04 DIAGNOSIS — R92.8 ABNORMAL MAMMOGRAM: Primary | ICD-10-CM

## 2024-09-04 DIAGNOSIS — R92.8 ABNORMAL MAMMOGRAM: ICD-10-CM

## 2024-09-04 PROCEDURE — 76642 ULTRASOUND BREAST LIMITED: CPT | Mod: LT

## 2024-09-04 PROCEDURE — 77065 DX MAMMO INCL CAD UNI: CPT | Mod: LEFT SIDE | Performed by: STUDENT IN AN ORGANIZED HEALTH CARE EDUCATION/TRAINING PROGRAM

## 2024-09-04 PROCEDURE — 77061 BREAST TOMOSYNTHESIS UNI: CPT | Mod: LEFT SIDE | Performed by: STUDENT IN AN ORGANIZED HEALTH CARE EDUCATION/TRAINING PROGRAM

## 2024-09-04 PROCEDURE — 76642 ULTRASOUND BREAST LIMITED: CPT | Mod: LEFT SIDE | Performed by: STUDENT IN AN ORGANIZED HEALTH CARE EDUCATION/TRAINING PROGRAM

## 2024-09-04 PROCEDURE — 77061 BREAST TOMOSYNTHESIS UNI: CPT | Mod: LT

## 2024-09-04 PROCEDURE — 76982 USE 1ST TARGET LESION: CPT | Mod: LT

## 2024-09-06 ENCOUNTER — OFFICE VISIT (OUTPATIENT)
Dept: ORTHOPEDIC SURGERY | Facility: CLINIC | Age: 64
End: 2024-09-06
Payer: COMMERCIAL

## 2024-09-06 DIAGNOSIS — S82.141A TIBIAL PLATEAU FRACTURE, RIGHT, CLOSED, INITIAL ENCOUNTER: Primary | ICD-10-CM

## 2024-09-06 NOTE — PROGRESS NOTES
Edna Clarke  is a 63 y.o. year-old  female. she returns for follow-up for her knee.  She has a knee brace that she would like to be make sure it is fitting correctly.  Had a recent MRI.    Past Medical, Family, and Social History reviewed   Review of Systems  A complete review of systems was conducted, pertinent only to the HPI noted above.  Constitutional: None  Eyes: No additions to above history  Ears, Nose, Throat: No additions to above history  Cardiovascular: No additions to above history  Respiratory: No additions to above history  GI: No additions to above history  : No additions to above history  Skin/Neuro: No additions to above history  Endocrine/Heme/Lymph: No additions to above history  Immunologic: No additions to above history  Psychiatric: No additions to above history  Musculoskeletal: see above    GEN: Alert and Oriented x 3  Constitutional: Well appearing , in no apparent distress.  Skin: No rashes, erythema, or induration around knee    MUSCULOSKELETAL EXAM:     Right KNEE:  ROM: 0/0/130  Effusion: negative  Alignment: [neutral]      Gait: [normal]  Sensation intact bilaterally sural/saphenous/sp/dp/tibial nerve bilaterally  Motor 5/5 knee flexion/extension/foot DF/PF/EHL/FHL bilaterally  Palpable/symmetric DP and PT pulse bilaterally      PATELLAR/EXTENSOR MECHANISM:   KNEE:  Patellar Crepitus: n  Patellar Compression Pain:n  Patellar Apprehension: [no]  Extensor Mechanism: [intact]  Straight Leg Raise: good      LIGAMENTS:  ACL: Lachmans: [negative]  PCL: [stable]  Valgus at 0 degrees: [stable]  Valgus at 30 degrees: [stable]  Varus at 0 degrees: [stable]  Varus at 30 degrees: [stable]    MENISCUS EXAM:  Joint Line Tenderness: There is a little bit of tenderness over her medial femoral condyle as well as over lateral proximal tibia  McMurrays: [negative]  Pain with Deep Flexion: [No]    Xrays and MRI independently viewed and interpreted: Lytic lesion of the distal medial femoral  condyle on the MRI there does not appear to be a cortical breach but does appear to thin out in some areas also edema in the proximal tibia concerning for a potential nondisplaced tibia fracture there was no MRI report for review  MRI confirmed a nondisplaced lateral tibial plateau fracture and what was read as a intraosseous lipoma in the medial condyle of the knee    I reviewed with the patient that she does have an acute lateral tibial plateau fracture with minimal displacement this is amenable to nonoperative treatment.  I have stressed with her the importance of being nonweightbearing she has not been very compliant with this.  She does have a hinged knee brace which will protect her.  We will have her follow-up with Dr. Mcallister with regards to the lesion in her femur and the need for any further follow-up.  See her back in 4 weeks with repeat radiographs of the knee.  All questions were answered she is in agreement with this plan.

## 2024-09-09 DIAGNOSIS — M17.10 ARTHRITIS OF KNEE: ICD-10-CM

## 2024-09-09 RX ORDER — DICLOFENAC SODIUM 75 MG/1
75 TABLET, DELAYED RELEASE ORAL 2 TIMES DAILY
Qty: 180 TABLET | Refills: 2 | Status: SHIPPED | OUTPATIENT
Start: 2024-09-09

## 2024-09-18 ENCOUNTER — OFFICE VISIT (OUTPATIENT)
Dept: ORTHOPEDIC SURGERY | Facility: CLINIC | Age: 64
End: 2024-09-18
Payer: COMMERCIAL

## 2024-09-18 VITALS — HEIGHT: 62 IN | WEIGHT: 121 LBS | BODY MASS INDEX: 22.26 KG/M2

## 2024-09-18 DIAGNOSIS — M89.50 OSTEOLYTIC LESION: Primary | ICD-10-CM

## 2024-09-18 PROCEDURE — 99214 OFFICE O/P EST MOD 30 MIN: CPT | Performed by: STUDENT IN AN ORGANIZED HEALTH CARE EDUCATION/TRAINING PROGRAM

## 2024-09-18 ASSESSMENT — ENCOUNTER SYMPTOMS
OCCASIONAL FEELINGS OF UNSTEADINESS: 0
DEPRESSION: 0
LOSS OF SENSATION IN FEET: 0

## 2024-09-18 ASSESSMENT — PATIENT HEALTH QUESTIONNAIRE - PHQ9
SUM OF ALL RESPONSES TO PHQ9 QUESTIONS 1 AND 2: 0
2. FEELING DOWN, DEPRESSED OR HOPELESS: NOT AT ALL
1. LITTLE INTEREST OR PLEASURE IN DOING THINGS: NOT AT ALL

## 2024-09-18 ASSESSMENT — PAIN - FUNCTIONAL ASSESSMENT: PAIN_FUNCTIONAL_ASSESSMENT: NO/DENIES PAIN

## 2024-09-18 NOTE — PROGRESS NOTES
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 Sterling Hurtado.  She states around a month ago she was cutting up a tree with a chainsaw when a branch came back around and hit her right knee.  She has had right knee pain in the past, but after the accident the pain significantly increased especially with weight bearing activities.  Went to an urgent care and had a X ray and they recommended in getting a MRI.  Since urgent care can not get a MRI, her PCP ordered one for her.  After the MRI she went to see Dr. Hurtado since she had a meniscus tear.  There he saw a bone lesion he did not think was to concerning, but said to come here and it get it looked at.  Knee pain is going down since the accident and has been wearing a brace to help with the pain.  Swelling has significantly went down also.  Does not notice any increase in pain in the distal femur with weightbearing activities or at rest.  She does notice sometimes she will get muscle tightness on that area but nothing in particular sticks out there.    ROS:  15 point review of systems collected per intake sheet and negative except for as noted in HPI.    PMH:   Past Medical History:   Diagnosis Date    Acute bronchitis 06/16/2023    Acute URI 06/16/2023    Acute UTI 06/16/2023    No history of DVT.     PSH:    Past Surgical History:   Procedure Laterality Date    BREAST BIOPSY Right     benign, pt unsure when    BREAST CYST ASPIRATION Bilateral     benign, pt unsure when        SHx: Smokes around half a pack of cigarettes a day. No IVDU    Meds:   Current Outpatient Medications on File Prior to Visit   Medication Sig Dispense Refill    diclofenac (Voltaren) 75 mg EC tablet Take 1 tablet (75 mg) by mouth 2 times a day. 180 tablet 2    traZODone (Desyrel) 100 mg  tablet Take 1 tablet (100 mg) by mouth once daily at bedtime. 90 tablet 3     No current facility-administered medications on file prior to visit.       PHYSICAL EXAM    GEN: AaOx4, NAD,   HEENT: normocephalic atraumatic, EOMI, MMM, pupils equal and round  PSYCH: appropriate mood and affect  RESP: nonlabored breathing on room air  CARDIAC: Extremities WWP, RRR to peripheral palpation  NEURO: CN 2-12 grossly intact  SKIN: Intact with no ulceration or lesions    Right lower extremity exam  Sensation intact to light touch  2+ DP pulse  Apartment soft and compressible  No effusion of the right knee  Palpation of the medial joint line  Nontender to palpation of the distal femur on the medial and lateral side  0 to 120 degrees range of motion  Negative ligamentous exam  5 out of 5 manual muscle testing with knee flexion/extension no lag with straight leg raise    Imaging:    XR of the right knee, obtained and personally reviewed today, shows a geographic, eccentric lytic lesion with sclerotic borders of the right distal femur.  No apparent soft tissue appearance noted.  MRI of the right knee was performed without contrast which limits ability to be able to fully define the lesion.  Lesion was bright on T1 dark on T2 with some bright signal length noted.  There was no cortical destruction in the metaphyseal region with sclerotic borders with no extension in the soft tissues.      Assessment/Plan:    Patient is a 64-year-old female who presents to the clinic as a referral from Dr. Hurtado for a distal right metaphyseal lesion noted on x-ray and MRI.  There is low concern of a malignant lesion as it was thought to be an intraosseous lipoma.  On the x-ray and MRI there is a lytic lesion with sclerotic borders with no cortical destruction or soft tissue involvement.  The MRI was without contrast so hard to delineate the full perspective of the lesion at this time.  With the available MRI there is lower suspicion at this time for  a malignant lesion.  We recommend to get a repeat MRI with and without contrast in 3 months and preferred it to be at a  facility but understand the financial cost that come with this.  In the meantime we will continue weightbearing as tolerated with the use of a knee brace as needed for pain.  Can continue to follow with Dr. Frias's recommendations for her nondisplaced lateral tibial plateau and lateral meniscus tear.  We will see the patient back in 3 months after the MRI is performed to discuss results.  Pending MRI results we will discuss further management moving forward.  If the patient has any questions regarding her care moving forward please call the office for further assistance.

## 2024-09-25 ENCOUNTER — HOSPITAL ENCOUNTER (OUTPATIENT)
Dept: RADIOLOGY | Facility: HOSPITAL | Age: 64
Discharge: HOME | End: 2024-09-25
Payer: COMMERCIAL

## 2024-09-25 ENCOUNTER — PRE-ADMISSION TESTING (OUTPATIENT)
Dept: PREADMISSION TESTING | Facility: HOSPITAL | Age: 64
End: 2024-09-25
Payer: COMMERCIAL

## 2024-09-25 VITALS
DIASTOLIC BLOOD PRESSURE: 72 MMHG | OXYGEN SATURATION: 100 % | RESPIRATION RATE: 18 BRPM | SYSTOLIC BLOOD PRESSURE: 128 MMHG | HEART RATE: 74 BPM

## 2024-09-25 VITALS — OXYGEN SATURATION: 98 % | RESPIRATION RATE: 16 BRPM | HEART RATE: 84 BPM

## 2024-09-25 DIAGNOSIS — R92.8 ABNORMAL MAMMOGRAM: ICD-10-CM

## 2024-09-25 DIAGNOSIS — R92.8 ABNORMAL MAMMOGRAM: Primary | ICD-10-CM

## 2024-09-25 DIAGNOSIS — F41.8 DEPRESSION WITH ANXIETY: ICD-10-CM

## 2024-09-25 DIAGNOSIS — F17.200 TOBACCO USE DISORDER: ICD-10-CM

## 2024-09-25 PROCEDURE — 77065 DX MAMMO INCL CAD UNI: CPT

## 2024-09-25 PROCEDURE — 19083 BX BREAST 1ST LESION US IMAG: CPT | Mod: LT

## 2024-09-25 PROCEDURE — 77065 DX MAMMO INCL CAD UNI: CPT | Mod: LEFT SIDE | Performed by: STUDENT IN AN ORGANIZED HEALTH CARE EDUCATION/TRAINING PROGRAM

## 2024-09-25 PROCEDURE — C1819 TISSUE LOCALIZATION-EXCISION: HCPCS

## 2024-09-25 PROCEDURE — 19083 BX BREAST 1ST LESION US IMAG: CPT | Mod: LEFT SIDE | Performed by: STUDENT IN AN ORGANIZED HEALTH CARE EDUCATION/TRAINING PROGRAM

## 2024-09-25 PROCEDURE — A4648 IMPLANTABLE TISSUE MARKER: HCPCS

## 2024-09-25 PROCEDURE — 2720000007 HC OR 272 NO HCPCS

## 2024-09-25 RX ORDER — MAGNESIUM 250 MG
1 TABLET ORAL DAILY
COMMUNITY

## 2024-09-25 ASSESSMENT — ENCOUNTER SYMPTOMS
NECK NEGATIVE: 1
GASTROINTESTINAL NEGATIVE: 1
WHEEZING: 0
EYES NEGATIVE: 1
CONSTITUTIONAL NEGATIVE: 1
WEAKNESS: 0
COUGH: 0
MUSCULOSKELETAL NEGATIVE: 1
CHILLS: 0
ENDOCRINE NEGATIVE: 1
LIGHT-HEADEDNESS: 0
NEUROLOGICAL NEGATIVE: 1
CARDIOVASCULAR NEGATIVE: 1
FEVER: 0
NUMBNESS: 0
RESPIRATORY NEGATIVE: 1
PALPITATIONS: 0
SHORTNESS OF BREATH: 0

## 2024-09-25 ASSESSMENT — PAIN SCALES - GENERAL: PAINLEVEL_OUTOF10: 0 - NO PAIN

## 2024-09-25 NOTE — CPM/PAT H&P
CPM/PAT Evaluation       Name: Edna Clarke (Edna Clarke)  /Age: 1960/64 y.o.     Visit Type:   In-Person       Chief Complaint: abnormal mammogram    HPI    Edna Alcantar a 64 y.o. female here for evaluation after abnormal mammogram and recommended biopsy.    Patient is here for H+P prior to biopsy.     Sx: denies any sx.    Findings:    Right breast without abnormalities  Microcalcifications within upper outer quadrant of left breast.   US 24: left breast at 2 o'clock position, 6 cm from the nipple there  is an oval partially circumscribed and partially obscured parallel  hypoechoic mass. This measures up to 6 x 5 x 3 mm.    Prior cancer: none    Age of first menses: 11    Pertinent GYN history: no hx of abnormal PAP,  never pregnant. Never had and STI, no hx of abd surgeries.    No DM, HTN, hyperlipidemia, CAD.  Smoking 1/2 pack a day.  Alcohol a few days a week, no daily. No drug use.     Lost job.  Dog with CA and leg amputation.  Inc depression.  Eating but thinks she lost 4 lb.     Not ready to quit.    Past Medical History:   Diagnosis Date    Acute bronchitis 2023    Acute URI 2023    Acute UTI 2023       Past Surgical History:   Procedure Laterality Date    BREAST BIOPSY Right     benign, pt unsure when    BREAST CYST ASPIRATION Bilateral     benign, pt unsure when       Patient Sexual activity questions deferred to the physician.    Family History   Problem Relation Name Age of Onset    Other (CABG) Father         Allergies   Allergen Reactions    Bupropion Hcl Unknown and Hives    Morphine Unknown and GI Upset    Sulfamethoxazole-Trimethoprim Unknown       Prior to Admission medications    Medication Sig Start Date End Date Taking? Authorizing Provider   diclofenac (Voltaren) 75 mg EC tablet Take 1 tablet (75 mg) by mouth 2 times a day. 24   Timmy Andres MD   traZODone (Desyrel) 100 mg tablet Take 1 tablet (100 mg) by mouth once daily at bedtime.  6/24/24 6/24/25  Timmy Andres MD        PAT ROS:   Constitutional:   neg     no fever   no chills  Neuro/Psych:   neg     no numbness   no weakness   no light-headedness  Eyes:   neg    Ears:   neg    Nose:   neg    Mouth:   neg    Throat:   neg    Neck:   neg    Cardio:   neg     no chest pain   no palpitations   no peripheral edema  Respiratory:   neg     no cough   no wheezing   no shortness of breath  Endocrine:   neg    GI:   neg    :   neg    Musculoskeletal:   neg    Hematologic:   Skin:  neg        Physical Exam  Vitals reviewed.   Constitutional:       General: She is not in acute distress.     Appearance: Normal appearance. She is normal weight. She is not ill-appearing, toxic-appearing or diaphoretic.   HENT:      Head: Normocephalic and atraumatic.      Mouth/Throat:      Mouth: Mucous membranes are moist.   Eyes:      General: No scleral icterus.        Right eye: No discharge.         Left eye: No discharge.      Conjunctiva/sclera: Conjunctivae normal.   Neck:      Vascular: No carotid bruit.   Cardiovascular:      Rate and Rhythm: Normal rate and regular rhythm.      Pulses: Normal pulses.      Heart sounds: Normal heart sounds. No murmur heard.     No friction rub. No gallop.   Pulmonary:      Effort: Pulmonary effort is normal. No respiratory distress.      Breath sounds: Normal breath sounds.   Chest:   Breasts:     Right: Inverted nipple present. No swelling, bleeding, mass, nipple discharge, skin change or tenderness.      Left: Inverted nipple present. No swelling, bleeding, mass, nipple discharge, skin change or tenderness.   Abdominal:      General: Bowel sounds are normal.      Palpations: Abdomen is soft.   Musculoskeletal:      Cervical back: Normal range of motion and neck supple. No rigidity.      Right lower leg: No edema.      Left lower leg: No edema.      Comments: Knee brace on RLE.   Skin:     General: Skin is warm and dry.   Neurological:      Mental Status: She is alert and  oriented to person, place, and time.      Gait: Gait normal.   Psychiatric:         Mood and Affect: Mood normal.         Behavior: Behavior normal.         Thought Content: Thought content normal.         Judgment: Judgment normal.          Airway    There were no vitals taken for this visit.    DASI Risk Score    No data to display       Caprini DVT Assessment    No data to display       Modified Frailty Index    No data to display       CHADS2 Stroke Risk  Current as of yesterday        N/A 3 to 100%: High Risk   2 to < 3%: Medium Risk   0 to < 2%: Low Risk     Last Change: N/A          This score determines the patient's risk of having a stroke if the patient has atrial fibrillation.        This score is not applicable to this patient. Components are not calculated.          Revised Cardiac Risk Index    No data to display       Apfel Simplified Score    No data to display       Risk Analysis Index Results This Encounter    No data found in the last 1 encounters.         Assessment and Plan:     Abnormal mammogram  - plan for biopsy today as scheduled.   - additional recommendations to be determined based on biopsy results.     Tobacco use disorder  - not ready to quit.  - counseling time 3 minutes.    Depression with anxiety  - cont trazodone 100 mg HS  - discuss with PCP additional medication for management of worse depression sx with new stressors in life - consider sertraline / zoloft. Escitalopram/ Lexapro not helpful in the past.     AMISHA Barber-CNP

## 2024-09-25 NOTE — PRE-PROCEDURE NOTE
Interventional Radiology Preprocedure Note    Indication for procedure: The encounter diagnosis was Abnormal mammogram.    Relevant review of systems: NA    Relevant Labs:   Lab Results   Component Value Date    CREATININE 0.66 12/19/2022       Planned Sedation/Anesthesia: Local    Airway assessment: N/A    Directed physical examination:    N/A    Mallampati: N/A    ASA Score: ASA 1 - Normal health patient    Benefits, risks and alternatives of procedure and planned sedation have been discussed with the patient and/or their representative. All questions answered and they agree to proceed.

## 2024-09-25 NOTE — DISCHARGE INSTRUCTIONS
Follow up with physician in 10 days for test results  My chart will post test results when completed  Report any signs of infection to your physician   May use ice or over the counter pain medication if needed  My shower tomorrow  May remove bandage one day after procedure

## 2024-10-01 LAB
LABORATORY COMMENT REPORT: NORMAL
PATH REPORT.FINAL DX SPEC: NORMAL
PATH REPORT.GROSS SPEC: NORMAL
PATH REPORT.RELEVANT HX SPEC: NORMAL
PATH REPORT.TOTAL CANCER: NORMAL
RESIDENT REVIEW: NORMAL

## 2024-10-04 ENCOUNTER — APPOINTMENT (OUTPATIENT)
Dept: ORTHOPEDIC SURGERY | Facility: CLINIC | Age: 64
End: 2024-10-04
Payer: COMMERCIAL

## 2024-10-04 ENCOUNTER — TELEPHONE (OUTPATIENT)
Dept: ORTHOPEDIC SURGERY | Facility: CLINIC | Age: 64
End: 2024-10-04

## 2024-10-04 DIAGNOSIS — R92.8 ABNORMAL MAMMOGRAM: Primary | ICD-10-CM

## 2024-10-04 NOTE — TELEPHONE ENCOUNTER
Called and spoke with pt about breast biopsy results. Biopsy with fibrous tissue, concordant. Recommend repeat diagnostic mammogram in 6 months. Order placed. Pt may schedule now. Questions answered.

## 2024-11-29 ENCOUNTER — APPOINTMENT (OUTPATIENT)
Dept: RADIOLOGY | Facility: CLINIC | Age: 64
End: 2024-11-29
Payer: COMMERCIAL

## 2024-12-02 ENCOUNTER — APPOINTMENT (OUTPATIENT)
Dept: PRIMARY CARE | Facility: CLINIC | Age: 64
End: 2024-12-02
Payer: COMMERCIAL

## 2024-12-12 ENCOUNTER — APPOINTMENT (OUTPATIENT)
Dept: PRIMARY CARE | Facility: CLINIC | Age: 64
End: 2024-12-12
Payer: COMMERCIAL

## 2024-12-12 VITALS
HEIGHT: 62 IN | BODY MASS INDEX: 23 KG/M2 | WEIGHT: 125 LBS | OXYGEN SATURATION: 98 % | DIASTOLIC BLOOD PRESSURE: 76 MMHG | HEART RATE: 76 BPM | TEMPERATURE: 98.1 F | SYSTOLIC BLOOD PRESSURE: 128 MMHG

## 2024-12-12 DIAGNOSIS — E55.9 VITAMIN D DEFICIENCY: ICD-10-CM

## 2024-12-12 DIAGNOSIS — Z23 NEED FOR INFLUENZA VACCINATION: ICD-10-CM

## 2024-12-12 DIAGNOSIS — Z00.00 ROUTINE GENERAL MEDICAL EXAMINATION AT HEALTH CARE FACILITY: Primary | ICD-10-CM

## 2024-12-12 ASSESSMENT — ACTIVITIES OF DAILY LIVING (ADL)
PREPARING MEALS: INDEPENDENT
NEEDS ASSISTANCE WITH FOOD: INDEPENDENT
PATIENT'S MEMORY ADEQUATE TO SAFELY COMPLETE DAILY ACTIVITIES?: YES
ADEQUATE_TO_COMPLETE_ADL: YES
EATING: INDEPENDENT
DOING_HOUSEWORK: INDEPENDENT
TOILETING: INDEPENDENT
JUDGMENT_ADEQUATE_SAFELY_COMPLETE_DAILY_ACTIVITIES: YES
MANAGING_FINANCES: INDEPENDENT
STIL DRIVING: YES
USING TRANSPORTATION: INDEPENDENT
FEEDING YOURSELF: INDEPENDENT
TAKING_MEDICATION: INDEPENDENT
DRESSING: INDEPENDENT
BATHING: INDEPENDENT
GROCERY_SHOPPING: INDEPENDENT
GROOMING: INDEPENDENT
USING TELEPHONE: INDEPENDENT

## 2024-12-12 ASSESSMENT — PATIENT HEALTH QUESTIONNAIRE - PHQ9
SUM OF ALL RESPONSES TO PHQ9 QUESTIONS 1 AND 2: 0
1. LITTLE INTEREST OR PLEASURE IN DOING THINGS: NOT AT ALL
2. FEELING DOWN, DEPRESSED OR HOPELESS: NOT AT ALL

## 2024-12-12 ASSESSMENT — ENCOUNTER SYMPTOMS
DYSURIA: 0
EYE PAIN: 0
PALPITATIONS: 0
FACIAL ASYMMETRY: 0
HALLUCINATIONS: 0
WHEEZING: 0
UNEXPECTED WEIGHT CHANGE: 0
NUMBNESS: 0
SINUS PAIN: 0
NERVOUS/ANXIOUS: 0
CONFUSION: 0
TROUBLE SWALLOWING: 0
SLEEP DISTURBANCE: 0
ACTIVITY CHANGE: 0
DIARRHEA: 0
ARTHRALGIAS: 0
TREMORS: 0
FATIGUE: 0
FREQUENCY: 0
PHOTOPHOBIA: 0
VOICE CHANGE: 0
WEAKNESS: 0
COLOR CHANGE: 0
NAUSEA: 0
SORE THROAT: 0
WOUND: 0
APPETITE CHANGE: 0
MYALGIAS: 0
POLYDIPSIA: 0
SPEECH DIFFICULTY: 0
CONSTIPATION: 0
NECK PAIN: 0
JOINT SWELLING: 0
POLYPHAGIA: 0
SEIZURES: 0
ABDOMINAL PAIN: 0
CHEST TIGHTNESS: 0
ADENOPATHY: 0
HEMATURIA: 0
BACK PAIN: 0
FEVER: 0
VOMITING: 0
FLANK PAIN: 0
COUGH: 0
DIZZINESS: 0
SHORTNESS OF BREATH: 0
BLOOD IN STOOL: 0
STRIDOR: 0
HEADACHES: 0

## 2024-12-12 ASSESSMENT — ANXIETY QUESTIONNAIRES
4. TROUBLE RELAXING: NOT AT ALL
3. WORRYING TOO MUCH ABOUT DIFFERENT THINGS: NOT AT ALL
1. FEELING NERVOUS, ANXIOUS, OR ON EDGE: NOT AT ALL
5. BEING SO RESTLESS THAT IT IS HARD TO SIT STILL: NOT AT ALL
7. FEELING AFRAID AS IF SOMETHING AWFUL MIGHT HAPPEN: NOT AT ALL
GAD7 TOTAL SCORE: 0
2. NOT BEING ABLE TO STOP OR CONTROL WORRYING: NOT AT ALL
6. BECOMING EASILY ANNOYED OR IRRITABLE: NOT AT ALL

## 2024-12-12 ASSESSMENT — VISUAL ACUITY
OD_CC: 20/20
OS_CC: 20/15

## 2024-12-12 NOTE — PROGRESS NOTES
Subjective   Reason for Visit: Edna Clarke is an 64 y.o. female here for a Medicare Wellness visit.     Past Medical, Surgical, and Family History reviewed and updated in chart.    Reviewed all medications by prescribing practitioner or clinical pharmacist (such as prescriptions, OTCs, herbal therapies and supplements) and documented in the medical record.    HPI  Patient presented to the office for medicare wellness visit.   She is taking Trazodone and its working well for insomnia and she is compliant. She is not on any other prescription medication. She does take OTC medication which has been reviewed and discussed with the patient.    Patient Care Team:  Timmy Andres MD as PCP - General  Timmy Andres MD as PCP - [facility] [employer] PCP     Review of Systems   Constitutional:  Negative for activity change, appetite change, fatigue, fever and unexpected weight change.   HENT:  Negative for dental problem, ear discharge, hearing loss, nosebleeds, postnasal drip, sinus pain, sore throat, tinnitus, trouble swallowing and voice change.    Eyes:  Negative for photophobia, pain and visual disturbance.   Respiratory:  Negative for cough, chest tightness, shortness of breath, wheezing and stridor.    Cardiovascular:  Negative for chest pain, palpitations and leg swelling.   Gastrointestinal:  Negative for abdominal pain, blood in stool, constipation, diarrhea, nausea and vomiting.   Endocrine: Negative for polydipsia, polyphagia and polyuria.   Genitourinary:  Negative for decreased urine volume, dyspareunia, dysuria, flank pain, frequency, hematuria and urgency.   Musculoskeletal:  Negative for arthralgias, back pain, gait problem, joint swelling, myalgias and neck pain.   Skin:  Negative for color change, rash and wound.   Allergic/Immunologic: Negative for environmental allergies and food allergies.   Neurological:  Negative for dizziness, tremors, seizures, syncope, facial asymmetry, speech  "difficulty, weakness, numbness and headaches.   Hematological:  Negative for adenopathy.   Psychiatric/Behavioral:  Negative for behavioral problems, confusion, hallucinations, self-injury, sleep disturbance and suicidal ideas. The patient is not nervous/anxious.      Objective   Vitals:  /76   Pulse 76   Temp 36.7 °C (98.1 °F)   Ht 1.575 m (5' 2\")   Wt 56.7 kg (125 lb)   SpO2 98%   BMI 22.86 kg/m²       Physical Exam  Vitals and nursing note reviewed.   Constitutional:       General: She is not in acute distress.     Appearance: Normal appearance. She is not ill-appearing or toxic-appearing.   HENT:      Head: Normocephalic.      Nose: Nose normal.   Eyes:      Conjunctiva/sclera: Conjunctivae normal.      Pupils: Pupils are equal, round, and reactive to light.   Cardiovascular:      Rate and Rhythm: Normal rate and regular rhythm.      Pulses: Normal pulses.      Heart sounds: Normal heart sounds. No murmur heard.     No gallop.   Pulmonary:      Effort: Pulmonary effort is normal. No respiratory distress.      Breath sounds: Normal breath sounds. No stridor. No wheezing, rhonchi or rales.   Abdominal:      General: Bowel sounds are normal.      Palpations: Abdomen is soft.      Tenderness: There is no abdominal tenderness.   Musculoskeletal:         General: No deformity. Normal range of motion.      Cervical back: Normal range of motion and neck supple. No rigidity or tenderness.      Right lower leg: No edema.      Left lower leg: No edema.   Skin:     General: Skin is warm.      Coloration: Skin is not jaundiced.      Findings: No bruising, erythema or rash.   Neurological:      General: No focal deficit present.      Mental Status: She is alert and oriented to person, place, and time.      Cranial Nerves: No cranial nerve deficit.      Gait: Gait normal.   Psychiatric:         Mood and Affect: Mood normal.         Behavior: Behavior normal.         Thought Content: Thought content normal.         " Judgment: Judgment normal.       Assessment & Plan  Routine general medical examination at health care facility    Orders:    1 Year Follow Up In Primary Care - Wellness Exam; Future    Comprehensive Metabolic Panel; Future    Hemoglobin A1C; Future    Lipid Panel; Future    TSH with reflex to Free T4 if abnormal; Future    Vitamin D deficiency    Orders:    CBC and Auto Differential; Future    Vitamin D 25-Hydroxy,Total (for eval of Vitamin D levels); Future    Need for influenza vaccination    Orders:    Flu vaccine, trivalent, preservative free, HIGH-DOSE, age 65y+ (Fluzone)

## 2024-12-16 ENCOUNTER — HOSPITAL ENCOUNTER (OUTPATIENT)
Dept: RADIOLOGY | Facility: CLINIC | Age: 64
Discharge: HOME | End: 2024-12-16
Payer: COMMERCIAL

## 2024-12-16 DIAGNOSIS — M89.50 OSTEOLYTIC LESION: ICD-10-CM

## 2024-12-16 PROCEDURE — 2550000001 HC RX 255 CONTRASTS: Performed by: STUDENT IN AN ORGANIZED HEALTH CARE EDUCATION/TRAINING PROGRAM

## 2024-12-16 PROCEDURE — A9575 INJ GADOTERATE MEGLUMI 0.1ML: HCPCS | Performed by: STUDENT IN AN ORGANIZED HEALTH CARE EDUCATION/TRAINING PROGRAM

## 2024-12-16 PROCEDURE — 73723 MRI JOINT LWR EXTR W/O&W/DYE: CPT | Mod: RT

## 2024-12-16 RX ORDER — GADOTERATE MEGLUMINE 376.9 MG/ML
11 INJECTION INTRAVENOUS
Status: COMPLETED | OUTPATIENT
Start: 2024-12-16 | End: 2024-12-16

## 2024-12-18 ENCOUNTER — APPOINTMENT (OUTPATIENT)
Dept: ORTHOPEDIC SURGERY | Facility: CLINIC | Age: 64
End: 2024-12-18
Payer: COMMERCIAL

## 2024-12-18 VITALS — HEIGHT: 63 IN | BODY MASS INDEX: 21.26 KG/M2 | WEIGHT: 120 LBS

## 2024-12-18 DIAGNOSIS — M89.50 OSTEOLYTIC LESION: ICD-10-CM

## 2024-12-18 PROCEDURE — 99214 OFFICE O/P EST MOD 30 MIN: CPT | Performed by: STUDENT IN AN ORGANIZED HEALTH CARE EDUCATION/TRAINING PROGRAM

## 2024-12-18 PROCEDURE — 3008F BODY MASS INDEX DOCD: CPT | Performed by: STUDENT IN AN ORGANIZED HEALTH CARE EDUCATION/TRAINING PROGRAM

## 2024-12-18 PROCEDURE — 4004F PT TOBACCO SCREEN RCVD TLK: CPT | Performed by: STUDENT IN AN ORGANIZED HEALTH CARE EDUCATION/TRAINING PROGRAM

## 2024-12-18 PROCEDURE — 1159F MED LIST DOCD IN RCRD: CPT | Performed by: STUDENT IN AN ORGANIZED HEALTH CARE EDUCATION/TRAINING PROGRAM

## 2024-12-18 PROCEDURE — 1160F RVW MEDS BY RX/DR IN RCRD: CPT | Performed by: STUDENT IN AN ORGANIZED HEALTH CARE EDUCATION/TRAINING PROGRAM

## 2024-12-18 PROCEDURE — 1125F AMNT PAIN NOTED PAIN PRSNT: CPT | Performed by: STUDENT IN AN ORGANIZED HEALTH CARE EDUCATION/TRAINING PROGRAM

## 2024-12-18 ASSESSMENT — ENCOUNTER SYMPTOMS
DEPRESSION: 0
OCCASIONAL FEELINGS OF UNSTEADINESS: 0
LOSS OF SENSATION IN FEET: 0

## 2024-12-18 ASSESSMENT — PATIENT HEALTH QUESTIONNAIRE - PHQ9
2. FEELING DOWN, DEPRESSED OR HOPELESS: NOT AT ALL
1. LITTLE INTEREST OR PLEASURE IN DOING THINGS: NOT AT ALL
SUM OF ALL RESPONSES TO PHQ9 QUESTIONS 1 AND 2: 0

## 2024-12-18 ASSESSMENT — PAIN - FUNCTIONAL ASSESSMENT: PAIN_FUNCTIONAL_ASSESSMENT: 0-10

## 2024-12-18 ASSESSMENT — PAIN SCALES - GENERAL: PAINLEVEL_OUTOF10: 3

## 2024-12-18 NOTE — PROGRESS NOTES
[facility] Progress Note:    CC: Follow-up right distal femur metaphyseal lesion    S: 64 y.o. female presents  to review a stability MRI from a right distal femur lesion.  In the interim she has had intermittent mechanical type right knee pain.  It is not bothersome enough that she would consider an injection at this time.  She is ambulating without assistive devices and does not have any concerns at this time.    Objective:    Exam:  Gen: alert, appropriately conversational, no apparent distress  Chest: symmetric chest rise, non-labored breathing  Heart: RRR to peripheral palpation    Right knee:  Sensation intact to light touch.  No effusion.  Mild lateral joint line tenderness.  0 to 120 degrees range of motion.  Knee extension, dorsiflexion plantarflexion intact.    Imaging:  X-rays of the right knee from 9/18/2024 demonstrate a geographic, eccentric lytic lesion with sclerotic borders in the right distal femur.  MRI from 8/22/2024 was compared to an MRI which was obtained on 12/16/2024 with and without contrast.   There continues to be a lesion that is bright on T1 and dark on T2 without cortical destruction. Most in line with lipomatous lesion of distal femur.     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  Based on relative stability on two MRIs 3 months apart there is low suspicion for malignancy at this time.  From my standpoint I would like to repeat MRI in 6 months and she can follow-up after this.  I do not believe radiographs would actually show us the lesion and therefore advanced imaging would be required to actually confirm stability.  Pending MRI results we will discuss further management moving forward.  She can follow-up with Dr. Hurtado for her lateral meniscus tear as needed.

## 2025-04-04 ENCOUNTER — HOSPITAL ENCOUNTER (OUTPATIENT)
Dept: RADIOLOGY | Facility: HOSPITAL | Age: 65
Discharge: HOME | End: 2025-04-04
Payer: MEDICARE

## 2025-04-04 VITALS — BODY MASS INDEX: 21.26 KG/M2 | HEIGHT: 63 IN | WEIGHT: 120 LBS

## 2025-04-04 DIAGNOSIS — R92.8 ABNORMAL MAMMOGRAM: ICD-10-CM

## 2025-04-04 PROCEDURE — 77065 DX MAMMO INCL CAD UNI: CPT | Mod: LT

## 2025-05-06 ENCOUNTER — TELEPHONE (OUTPATIENT)
Dept: ORTHOPEDIC SURGERY | Facility: HOSPITAL | Age: 65
End: 2025-05-06
Payer: MEDICARE

## 2025-05-06 NOTE — TELEPHONE ENCOUNTER
Rescheduled patient's appointment from 6/25/25. Patient did not want to come before November. Felt she did not need a sooner appointment.

## 2025-05-16 ENCOUNTER — APPOINTMENT (OUTPATIENT)
Dept: CARDIOLOGY | Facility: HOSPITAL | Age: 65
End: 2025-05-16
Payer: MEDICARE

## 2025-05-16 ENCOUNTER — APPOINTMENT (OUTPATIENT)
Dept: RADIOLOGY | Facility: HOSPITAL | Age: 65
End: 2025-05-16
Payer: MEDICARE

## 2025-05-16 ENCOUNTER — HOSPITAL ENCOUNTER (EMERGENCY)
Facility: HOSPITAL | Age: 65
Discharge: HOME | End: 2025-05-16
Payer: MEDICARE

## 2025-05-16 VITALS
OXYGEN SATURATION: 95 % | HEART RATE: 80 BPM | TEMPERATURE: 97.9 F | BODY MASS INDEX: 22.08 KG/M2 | HEIGHT: 62 IN | WEIGHT: 120 LBS | SYSTOLIC BLOOD PRESSURE: 142 MMHG | RESPIRATION RATE: 18 BRPM | DIASTOLIC BLOOD PRESSURE: 85 MMHG

## 2025-05-16 DIAGNOSIS — J43.9 PULMONARY EMPHYSEMA, UNSPECIFIED EMPHYSEMA TYPE (MULTI): ICD-10-CM

## 2025-05-16 DIAGNOSIS — T88.7XXA MEDICATION SIDE EFFECTS: ICD-10-CM

## 2025-05-16 DIAGNOSIS — K57.92 DIVERTICULITIS: Primary | ICD-10-CM

## 2025-05-16 LAB
ALBUMIN SERPL BCP-MCNC: 4.2 G/DL (ref 3.4–5)
ALP SERPL-CCNC: 75 U/L (ref 33–136)
ALT SERPL W P-5'-P-CCNC: 11 U/L (ref 7–45)
ANION GAP SERPL CALC-SCNC: 14 MMOL/L (ref 10–20)
AST SERPL W P-5'-P-CCNC: 14 U/L (ref 9–39)
ATRIAL RATE: 87 BPM
BASOPHILS # BLD AUTO: 0.01 X10*3/UL (ref 0–0.1)
BASOPHILS NFR BLD AUTO: 0.1 %
BILIRUB SERPL-MCNC: 0.3 MG/DL (ref 0–1.2)
BUN SERPL-MCNC: 18 MG/DL (ref 6–23)
CALCIUM SERPL-MCNC: 9.9 MG/DL (ref 8.6–10.3)
CARDIAC TROPONIN I PNL SERPL HS: 3 NG/L (ref 0–13)
CHLORIDE SERPL-SCNC: 97 MMOL/L (ref 98–107)
CO2 SERPL-SCNC: 32 MMOL/L (ref 21–32)
CREAT SERPL-MCNC: 0.74 MG/DL (ref 0.5–1.05)
EGFRCR SERPLBLD CKD-EPI 2021: 90 ML/MIN/1.73M*2
EOSINOPHIL # BLD AUTO: 0 X10*3/UL (ref 0–0.7)
EOSINOPHIL NFR BLD AUTO: 0 %
ERYTHROCYTE [DISTWIDTH] IN BLOOD BY AUTOMATED COUNT: 13.3 % (ref 11.5–14.5)
FLUAV RNA RESP QL NAA+PROBE: NOT DETECTED
FLUBV RNA RESP QL NAA+PROBE: NOT DETECTED
GLUCOSE BLD MANUAL STRIP-MCNC: 135 MG/DL (ref 74–99)
GLUCOSE SERPL-MCNC: 127 MG/DL (ref 74–99)
HCT VFR BLD AUTO: 42.5 % (ref 36–46)
HGB BLD-MCNC: 13.9 G/DL (ref 12–16)
IMM GRANULOCYTES # BLD AUTO: 0.01 X10*3/UL (ref 0–0.7)
IMM GRANULOCYTES NFR BLD AUTO: 0.1 % (ref 0–0.9)
LIPASE SERPL-CCNC: 12 U/L (ref 9–82)
LYMPHOCYTES # BLD AUTO: 1.04 X10*3/UL (ref 1.2–4.8)
LYMPHOCYTES NFR BLD AUTO: 13.8 %
MAGNESIUM SERPL-MCNC: 1.87 MG/DL (ref 1.6–2.4)
MCH RBC QN AUTO: 29.2 PG (ref 26–34)
MCHC RBC AUTO-ENTMCNC: 32.7 G/DL (ref 32–36)
MCV RBC AUTO: 89 FL (ref 80–100)
MONOCYTES # BLD AUTO: 0.33 X10*3/UL (ref 0.1–1)
MONOCYTES NFR BLD AUTO: 4.4 %
NEUTROPHILS # BLD AUTO: 6.13 X10*3/UL (ref 1.2–7.7)
NEUTROPHILS NFR BLD AUTO: 81.6 %
NRBC BLD-RTO: 0 /100 WBCS (ref 0–0)
P AXIS: 103 DEGREES
P OFFSET: 195 MS
P ONSET: 146 MS
PLATELET # BLD AUTO: 256 X10*3/UL (ref 150–450)
POTASSIUM SERPL-SCNC: 4 MMOL/L (ref 3.5–5.3)
PR INTERVAL: 164 MS
PROT SERPL-MCNC: 7.1 G/DL (ref 6.4–8.2)
Q ONSET: 228 MS
QRS COUNT: 14 BEATS
QRS DURATION: 70 MS
QT INTERVAL: 378 MS
QTC CALCULATION(BAZETT): 454 MS
QTC FREDERICIA: 427 MS
R AXIS: 107 DEGREES
RBC # BLD AUTO: 4.76 X10*6/UL (ref 4–5.2)
SARS-COV-2 RNA RESP QL NAA+PROBE: NOT DETECTED
SODIUM SERPL-SCNC: 139 MMOL/L (ref 136–145)
T AXIS: 93 DEGREES
T OFFSET: 417 MS
VENTRICULAR RATE: 87 BPM
WBC # BLD AUTO: 7.5 X10*3/UL (ref 4.4–11.3)

## 2025-05-16 PROCEDURE — 83690 ASSAY OF LIPASE: CPT | Performed by: NURSE PRACTITIONER

## 2025-05-16 PROCEDURE — 87502 INFLUENZA DNA AMP PROBE: CPT | Performed by: NURSE PRACTITIONER

## 2025-05-16 PROCEDURE — 36415 COLL VENOUS BLD VENIPUNCTURE: CPT | Performed by: NURSE PRACTITIONER

## 2025-05-16 PROCEDURE — 93005 ELECTROCARDIOGRAM TRACING: CPT

## 2025-05-16 PROCEDURE — 85025 COMPLETE CBC W/AUTO DIFF WBC: CPT | Performed by: NURSE PRACTITIONER

## 2025-05-16 PROCEDURE — 99285 EMERGENCY DEPT VISIT HI MDM: CPT | Mod: 25

## 2025-05-16 PROCEDURE — 2500000001 HC RX 250 WO HCPCS SELF ADMINISTERED DRUGS (ALT 637 FOR MEDICARE OP): Performed by: NURSE PRACTITIONER

## 2025-05-16 PROCEDURE — 70450 CT HEAD/BRAIN W/O DYE: CPT | Performed by: STUDENT IN AN ORGANIZED HEALTH CARE EDUCATION/TRAINING PROGRAM

## 2025-05-16 PROCEDURE — 80053 COMPREHEN METABOLIC PANEL: CPT | Performed by: NURSE PRACTITIONER

## 2025-05-16 PROCEDURE — 83735 ASSAY OF MAGNESIUM: CPT | Performed by: NURSE PRACTITIONER

## 2025-05-16 PROCEDURE — 71046 X-RAY EXAM CHEST 2 VIEWS: CPT

## 2025-05-16 PROCEDURE — 71275 CT ANGIOGRAPHY CHEST: CPT

## 2025-05-16 PROCEDURE — 71275 CT ANGIOGRAPHY CHEST: CPT | Mod: FOREIGN READ | Performed by: RADIOLOGY

## 2025-05-16 PROCEDURE — 96374 THER/PROPH/DIAG INJ IV PUSH: CPT | Mod: 59

## 2025-05-16 PROCEDURE — 71046 X-RAY EXAM CHEST 2 VIEWS: CPT | Mod: FOREIGN READ | Performed by: RADIOLOGY

## 2025-05-16 PROCEDURE — 82947 ASSAY GLUCOSE BLOOD QUANT: CPT | Mod: 59

## 2025-05-16 PROCEDURE — 96375 TX/PRO/DX INJ NEW DRUG ADDON: CPT | Mod: 59

## 2025-05-16 PROCEDURE — 2500000004 HC RX 250 GENERAL PHARMACY W/ HCPCS (ALT 636 FOR OP/ED): Mod: JZ | Performed by: NURSE PRACTITIONER

## 2025-05-16 PROCEDURE — 84484 ASSAY OF TROPONIN QUANT: CPT | Performed by: NURSE PRACTITIONER

## 2025-05-16 PROCEDURE — 74177 CT ABD & PELVIS W/CONTRAST: CPT

## 2025-05-16 PROCEDURE — 74177 CT ABD & PELVIS W/CONTRAST: CPT | Mod: FOREIGN READ | Performed by: RADIOLOGY

## 2025-05-16 PROCEDURE — 70450 CT HEAD/BRAIN W/O DYE: CPT

## 2025-05-16 PROCEDURE — 96361 HYDRATE IV INFUSION ADD-ON: CPT

## 2025-05-16 PROCEDURE — 2550000001 HC RX 255 CONTRASTS: Performed by: NURSE PRACTITIONER

## 2025-05-16 RX ORDER — AMOXICILLIN AND CLAVULANATE POTASSIUM 875; 125 MG/1; MG/1
1 TABLET, FILM COATED ORAL ONCE
Status: COMPLETED | OUTPATIENT
Start: 2025-05-16 | End: 2025-05-16

## 2025-05-16 RX ORDER — KETOROLAC TROMETHAMINE 15 MG/ML
15 INJECTION, SOLUTION INTRAMUSCULAR; INTRAVENOUS ONCE
Status: COMPLETED | OUTPATIENT
Start: 2025-05-16 | End: 2025-05-16

## 2025-05-16 RX ORDER — METOCLOPRAMIDE HYDROCHLORIDE 5 MG/ML
10 INJECTION INTRAMUSCULAR; INTRAVENOUS ONCE
Status: COMPLETED | OUTPATIENT
Start: 2025-05-16 | End: 2025-05-16

## 2025-05-16 RX ORDER — AMOXICILLIN AND CLAVULANATE POTASSIUM 875; 125 MG/1; MG/1
1 TABLET, FILM COATED ORAL EVERY 12 HOURS
Qty: 14 TABLET | Refills: 0 | Status: SHIPPED | OUTPATIENT
Start: 2025-05-16 | End: 2025-05-23

## 2025-05-16 RX ORDER — METOCLOPRAMIDE 10 MG/1
10 TABLET ORAL EVERY 6 HOURS
Qty: 28 TABLET | Refills: 0 | Status: SHIPPED | OUTPATIENT
Start: 2025-05-16 | End: 2025-05-23

## 2025-05-16 RX ADMIN — SODIUM CHLORIDE 1000 ML: 0.9 INJECTION, SOLUTION INTRAVENOUS at 10:33

## 2025-05-16 RX ADMIN — AMOXICILLIN AND CLAVULANATE POTASSIUM 1 TABLET: 875; 125 TABLET, FILM COATED ORAL at 14:39

## 2025-05-16 RX ADMIN — IOHEXOL 75 ML: 350 INJECTION, SOLUTION INTRAVENOUS at 13:20

## 2025-05-16 RX ADMIN — KETOROLAC TROMETHAMINE 15 MG: 15 INJECTION, SOLUTION INTRAMUSCULAR; INTRAVENOUS at 11:43

## 2025-05-16 RX ADMIN — METOCLOPRAMIDE 10 MG: 5 INJECTION, SOLUTION INTRAMUSCULAR; INTRAVENOUS at 10:28

## 2025-05-16 ASSESSMENT — LIFESTYLE VARIABLES
TOTAL SCORE: 0
HAVE PEOPLE ANNOYED YOU BY CRITICIZING YOUR DRINKING: NO
HAVE YOU EVER FELT YOU SHOULD CUT DOWN ON YOUR DRINKING: NO
EVER HAD A DRINK FIRST THING IN THE MORNING TO STEADY YOUR NERVES TO GET RID OF A HANGOVER: NO
EVER FELT BAD OR GUILTY ABOUT YOUR DRINKING: NO

## 2025-05-16 ASSESSMENT — PAIN - FUNCTIONAL ASSESSMENT
PAIN_FUNCTIONAL_ASSESSMENT: 0-10
PAIN_FUNCTIONAL_ASSESSMENT: 0-10

## 2025-05-16 ASSESSMENT — PAIN SCALES - GENERAL
PAINLEVEL_OUTOF10: 10 - WORST POSSIBLE PAIN
PAINLEVEL_OUTOF10: 5 - MODERATE PAIN

## 2025-05-16 ASSESSMENT — COLUMBIA-SUICIDE SEVERITY RATING SCALE - C-SSRS
2. HAVE YOU ACTUALLY HAD ANY THOUGHTS OF KILLING YOURSELF?: NO
1. IN THE PAST MONTH, HAVE YOU WISHED YOU WERE DEAD OR WISHED YOU COULD GO TO SLEEP AND NOT WAKE UP?: NO
6. HAVE YOU EVER DONE ANYTHING, STARTED TO DO ANYTHING, OR PREPARED TO DO ANYTHING TO END YOUR LIFE?: NO

## 2025-05-16 NOTE — ED TRIAGE NOTES
Patient from home having intermittent headache, n/v/d for the last month since starting semaglutide. Past 2 days headache nvd had gotten worse. Patient given 4mg of zofran by ems which did not help symptoms

## 2025-05-16 NOTE — ED PROVIDER NOTES
EMERGENCY DEPARTMENT ENCOUNTER      Pt Name: Edna Clarke  MRN: [id number]  Birthdate 1960  Date of evaluation: 5/16/2025  Provider: MIKAL Kenyon    CHIEF COMPLAINT     No chief complaint on file.      HISTORY OF PRESENT ILLNESS    Edna Clarke is a 64 y.o. female who presents to the emergency department with complaints of headache with associated nausea and vomiting over the last month after starting semaglutide for weight loss.  Patient stated acutely worsened over the last 2 days.  She endorses associated photophobia.  She took Tylenol, tramadol and Zofran p.o. at home without improvement of symptoms.  She received Zofran IV via EMS which also did not help her symptoms. denies any trauma, fall, injury or anticoagulation use.  Denies any smoking, drugs or alcohol use. She denies this being the worst headache of her life.  She denies any illicit drug use or excessive alcohol use.  She does daily smoke.  She denies any chest pain, shortness of breath, abdominal pain or any additional symptoms or complaints at this time.    Nursing Notes were reviewed.    History provided by patient.  No  used.    REVIEW OF SYSTEMS     ROS is otherwise negative unless stated above.    PAST MEDICAL HISTORY   Medical History[1]    SURGICAL HISTORY     Surgical History[2]    ALLERGIES     Bupropion hcl, Morphine, and Sulfamethoxazole-trimethoprim    FAMILY HISTORY     Family History[3]     SOCIAL HISTORY     Social History[4]    PHYSICAL EXAM   VS: As documented in the triage note and EMR flowsheet from this visit were reviewed.    GEN: NAD, nontoxic, well-appearing, sitting comfortably in the ER cart without difficulty or dyspnea   EYES:  EOMs grossly intact, anicteric sclera, no nystagmus noted, clear and equal bilaterally, no foreign body noted  HEENT: Airway patent, ears with clear tympanic membranes bilaterally. Nasal mucosa clear. Mouth with normal mucosa.  No area of abscess  or fluctuance noted.  No drainage noted. Throat has no vesicles, no oropharyngeal exudates and uvula is midline. Face with no lymph node enlargement. No trismus or drooling noted.  Handling secretions.  Speech clear.  CARD: RRR, nontender chest, no crepitus deformities, no JVD, no murmurs rubs or gallops ; No edema noted.  Positive pulses bilaterally throughout.  Capillary refill less than 3 seconds.  No abnormal redness, warmth, tenderness or swelling noted to bilateral lower extremities.  PULMONARY: Clear all lung fields. Moving air well, Nonlabored, no accessory muscle use, able to speak complete sentences  ABDOMEN: Abdomen soft, non-distended, no rebound, no guarding. Bowel sounds normal in all 4 quadrants. No tenderness to palpation.  No CVA tenderness.  No masses or organomegaly noted.  No evidence of peritonitis.   : deferred  MUSK: Spine appears normal, range of motion is not limited, no muscle or joint tenderness. Strength 5 out of 5 equal bilaterally throughout.  No step-offs, deformities or additional signs of trauma noted.  No spinal/midline tenderness to palpation  SKIN: Skin normal color for race, warm, dry and intact. No evidence of trauma. No rash noted.  NEURO: Alert and oriented x 3, speech is clear, no obvious deficits noted. No facial droop noted.  GCS 15  PSYCH: Alert and oriented to person, place, time/situation. normal mood and affect. No apparent risk to self or others. Thoughts are linear.  Does not appear decompensated.  Does not appear internally stimulated.  LYMPH: No adenopathy or splenomegaly. No cervical, supraclavicular or inguinal lymphadenopathy.    DIAGNOSTIC RESULTS   RADIOLOGY:   Non-plain film images such as CT, Ultrasound and MRI are read by the radiologist. Plain radiographic images are visualized and preliminarily interpreted by myself with the below findings: CT head which revealed no acute intracranial process.  Chest x-ray showed no acute cardiopulmonary  process      Interpretation per the Radiologist below, if available at the time of this note:    CT angio chest for pulmonary embolism   Final Result   1.No pulmonary embolism or acute aortic pathology.   2.Findings compatible with acute uncomplicated diverticulitis   involving the proximal sigmoid colon. No extraluminal gas or abscess.   There is wall thickening of the affected segment with a few minimally   prominent pericolonic lymph nodes measuring up to 7 mm. Recommend   follow-up colonoscopy once symptoms resolve if not recently performed.   3.Diffuse bronchial wall thickening suggestive of bronchitis.   Mild mucous plugging in the lower lobes, right middle lobe and   lingular. Areas of suspected bronchiolitis in the right middle lobe   with clustered nodules in a tree-in-bud configuration in the   subpleural region.   4.Mild to moderate emphysema.   5.Mild hepatic steatosis.   Signed by Gomez Ruby MD      CT abdomen pelvis w IV contrast   Final Result   1.No pulmonary embolism or acute aortic pathology.   2.Findings compatible with acute uncomplicated diverticulitis   involving the proximal sigmoid colon. No extraluminal gas or abscess.   There is wall thickening of the affected segment with a few minimally   prominent pericolonic lymph nodes measuring up to 7 mm. Recommend   follow-up colonoscopy once symptoms resolve if not recently performed.   3.Diffuse bronchial wall thickening suggestive of bronchitis.   Mild mucous plugging in the lower lobes, right middle lobe and   lingular. Areas of suspected bronchiolitis in the right middle lobe   with clustered nodules in a tree-in-bud configuration in the   subpleural region.   4.Mild to moderate emphysema.   5.Mild hepatic steatosis.   Signed by Gomez Ruby MD      XR chest 2 views   Final Result   No acute radiographic chest finding.   Signed by Rodrigue Valdez MD      CT head wo IV contrast   Final Result   1.  No acute intracranial hemorrhage, mass  effect, or CT apparent   acute infarct.   2. Mild bilateral maxillary sinusitis.             Signed by: Aníbal Sheth 5/16/2025 11:27 AM   Dictation workstation:   [id number]            ED BEDSIDE ULTRASOUND:   Performed by myself - none    LABS:  Labs Reviewed   CBC WITH AUTO DIFFERENTIAL - Abnormal       Result Value    WBC 7.5      nRBC 0.0      RBC 4.76      Hemoglobin 13.9      Hematocrit 42.5      MCV 89      MCH 29.2      MCHC 32.7      RDW 13.3      Platelets 256      Neutrophils % 81.6      Immature Granulocytes %, Automated 0.1      Lymphocytes % 13.8      Monocytes % 4.4      Eosinophils % 0.0      Basophils % 0.1      Neutrophils Absolute 6.13      Immature Granulocytes Absolute, Automated 0.01      Lymphocytes Absolute 1.04 (*)     Monocytes Absolute 0.33      Eosinophils Absolute 0.00      Basophils Absolute 0.01     COMPREHENSIVE METABOLIC PANEL - Abnormal    Glucose 127 (*)     Sodium 139      Potassium 4.0      Chloride 97 (*)     Bicarbonate 32      Anion Gap 14      Urea Nitrogen 18      Creatinine 0.74      eGFR 90      Calcium 9.9      Albumin 4.2      Alkaline Phosphatase 75      Total Protein 7.1      AST 14      Bilirubin, Total 0.3      ALT 11     POCT GLUCOSE - Abnormal    POCT Glucose 135 (*)    MAGNESIUM - Normal    Magnesium 1.87     LIPASE - Normal    Lipase 12      Narrative:     Venipuncture immediately after or during the administration of Metamizole may lead to falsely low results. Testing should be performed immediately prior to Metamizole dosing.   TROPONIN I, HIGH SENSITIVITY - Normal    Troponin I, High Sensitivity 3      Narrative:     Less than 99th percentile of normal range cutoff-  Female and children under 18 years old <14 ng/L; Male <21 ng/L: Negative  Repeat testing should be performed if clinically indicated.     Female and children under 18 years old 14-50 ng/L; Male 21-50 ng/L:  Consistent with possible cardiac damage and possible increased clinical   risk.  Serial measurements may help to assess extent of myocardial damage.     >50 ng/L: Consistent with cardiac damage, increased clinical risk and  myocardial infarction. Serial measurements may help assess extent of   myocardial damage.      NOTE: Children less than 1 year old may have higher baseline troponin   levels and results should be interpreted in conjunction with the overall   clinical context.     NOTE: Troponin I testing is performed using a different   testing methodology at [facility] than at other   [facility]. Direct result comparisons should only   be made within the same method.   INFLUENZA A AND B PCR - Normal    Flu A Result Not Detected      Flu B Result Not Detected      Narrative:     This assay is an in vitro diagnostic multiplex nucleic acid amplification test for the detection and discrimination of Influenza A & B from nasopharyngeal specimens, and has been validated for use at [facility]. Negative results do not preclude Influenza A/B infections, and should not be used as the sole basis for diagnosis, treatment, or other management decisions. If Influenza A/B and RSV PCR results are negative, testing for Parainfluenza virus, Adenovirus and Metapneumovirus is routinely performed for [facility] pediatric oncology and intensive care inpatients, and is available on other patients by placing an add-on request.   SARS-COV-2 PCR - Normal    Coronavirus 2019, PCR Not Detected      Narrative:     This assay is an FDA-cleared, in vitro diagnostic nucleic acid amplification test for the qualitative detection and differentiation of SARS CoV-2 from nasopharyngeal specimens collected from individuals with signs and symptoms of respiratory tract infections, and has been validated for use at [facility]. Negative results do not preclude COVID-19 infections and should not be used as the sole basis for diagnosis, treatment, or other management  decisions. Testing for SARS CoV-2 is recommended only for patients who meet current clinical and/or epidemiological criteria defined by federal, state, or local public health directives.   POCT GLUCOSE METER       All other labs were within normal range or not returned as of this dictation.    EMERGENCY DEPARTMENT COURSE/MDM:   Vitals:    Vitals:    05/16/25 1110 05/16/25 1200 05/16/25 1300 05/16/25 1430   BP: 148/89 150/89 142/85    Pulse:    80   Resp: 18      Temp:       SpO2: 98% (!) 92% 94% 95%   Weight:       Height:           I reviewed the patient's triage vitals.    This is a 64-year-old female presenting for evaluation of headache.  She is sitting comfortably in the ER cart without difficulty or dyspnea.  She is neurologically intact.  She is given medications for her symptoms.    I was informed by bedside RN that patient had a pulse ox in the 80s on room air and was placed on 2 L nasal cannula with improvement/normalization.  Patient does state that she is a daily smoker but has never been told that she has had COPD in the past.  She has no wheezing on my examination and is in no respiratory distress.    Initial workup was reviewed.  Due to her new hypoxia without any diagnosis of COPD I did add on additional imaging studies.  This was notable for diverticulitis and she will be treated with Augmentin p.o., given first dose in ER and educated to take the entire course.  There is no other abnormalities except for emphysema and patient likely has undiagnosed COPD and was taken off oxygen and remained without pulse ox of greater than 90%.  I am okay with this as I do not want to over oxygenate the patient with her emphysema.  Upon reevaluation she feels improved and reassured.  She is ambulatory without difficulty, dyspnea and independently.  She does not need any oxygen at this time.  She is tolerating p.o. without difficulty.  She remained otherwise hematin stable throughout my ED course of care.  She feels  comfortable being discharged home with symptomatic management and is educated on continued symptomatic supportive care at home.  Educated to maintain proper hydration and a healthy diet and to speak with her primary care provider regarding her semaglutide going forward.    Diagnoses as of 05/16/25 1451   Diverticulitis   Medication side effects   Pulmonary emphysema, unspecified emphysema type (Multi)       Patient was counseled regarding labs, imaging, likely diagnosis, and plan. All questions were answered.     ------------------------------------------------------------------  EKG Interpretation: Normal sinus rhythm at a rate 87, , QRS 70, QTc 454 without evidence of STEMI or ischemia    Differential Diagnoses Considered: Side effects of semaglutide, electrolyte abnormality, dehydration, acute intracranial process, pneumonia, pneumothorax, influenza, COVID, undiagnosed COPD    Chronic Medical Conditions Significantly Affecting Care: None    External Records Reviewed: I reviewed recent and relevant outside records including: PCP notes, prior discharge summary, previous radiologic studies, HIE    Escalation of Care:  Appropriate for outpatient management with primary care brother follow-up as scheduled on Monday for further evaluation and management.  Return precautions discussed and patient verbalized understanding. All questions and concerns were addressed.    Social Determinants of Health Significantly Affecting Care:  None    Prescription Drug Consideration: Over-the-counter ibuprofen and Tylenol p.o. at home as needed for pain.  Reglan p.o. for nausea management and course of Augmentin p.o. for diverticulitis treatment    Diagnostic testing considered: No additional indicated this time    Discussion of Management with Other Providers:   I discussed the patient/results with: None      ------------------------------------------------------------------  ED Medications administered this visit:    Medications    sodium chloride 0.9 % bolus 1,000 mL (0 mL intravenous Stopped 5/16/25 1315)   metoclopramide (Reglan) injection 10 mg (10 mg intravenous Given 5/16/25 1028)   ketorolac (Toradol) injection 15 mg (15 mg intravenous Given 5/16/25 1143)   iohexol (OMNIPaque) 350 mg iodine/mL solution 75 mL (75 mL intravenous Given 5/16/25 1320)   amoxicillin-clavulanate (Augmentin) 875-125 mg per tablet 1 tablet (1 tablet oral Given 5/16/25 1439)       New Prescriptions from this visit:    Discharge Medication List as of 5/16/2025  2:33 PM        START taking these medications    Details   amoxicillin-clavulanate (Augmentin) 875-125 mg tablet Take 1 tablet by mouth every 12 hours for 7 days., Starting Fri 5/16/2025, Until Fri 5/23/2025, Normal      metoclopramide (Reglan) 10 mg tablet Take 1 tablet (10 mg) by mouth every 6 hours for 7 days., Starting Fri 5/16/2025, Until Fri 5/23/2025, Normal             Follow-up:  Timmy Andres MD  [address]  [address]  [place] [address]  [phone]    Go to   as scheduled        Final Impression:   1. Diverticulitis    2. Medication side effects    3. Pulmonary emphysema, unspecified emphysema type (Multi)          Anaya Greenwood, APRN-CNP        (Please note that portions of this note were completed with a voice recognition program.  Efforts were made to edit the dictations but occasionally words are mis-transcribed.)         [1]   Past Medical History:  Diagnosis Date    Acute bronchitis 06/16/2023    Acute URI 06/16/2023    Acute UTI 06/16/2023   [2]   Past Surgical History:  Procedure Laterality Date    BREAST BIOPSY Right sept 2024    benign, pt unsure when    BREAST CYST ASPIRATION Bilateral 11 years    benign, pt unsure when    BREAST CYST EXCISION  11 years   [3]   Family History  Problem Relation Name Age of Onset    Other (CABG) Father     [4]   Social History  Socioeconomic History    Marital status:    Tobacco Use    Smoking status: Every Day     Current packs/day:  0.50     Types: Cigarettes     Passive exposure: Never    Smokeless tobacco: Never   Vaping Use    Vaping status: Never Used   Substance and Sexual Activity    Alcohol use: Yes     Alcohol/week: 3.0 standard drinks of alcohol     Types: 3 Glasses of wine per week    Drug use: Not Currently    Sexual activity: Paolo Greenwood, APRN-CNP  05/16/25 1458

## 2025-06-10 DIAGNOSIS — M17.10 ARTHRITIS OF KNEE: ICD-10-CM

## 2025-06-10 RX ORDER — DICLOFENAC SODIUM 75 MG/1
75 TABLET, DELAYED RELEASE ORAL 2 TIMES DAILY
Qty: 180 TABLET | Refills: 0 | Status: SHIPPED | OUTPATIENT
Start: 2025-06-10

## 2025-06-23 ENCOUNTER — APPOINTMENT (OUTPATIENT)
Dept: RADIOLOGY | Facility: CLINIC | Age: 65
End: 2025-06-23
Payer: MEDICARE

## 2025-06-25 ENCOUNTER — APPOINTMENT (OUTPATIENT)
Dept: ORTHOPEDIC SURGERY | Facility: CLINIC | Age: 65
End: 2025-06-25
Payer: MEDICARE

## 2025-07-08 ENCOUNTER — OFFICE VISIT (OUTPATIENT)
Dept: PRIMARY CARE | Facility: CLINIC | Age: 65
End: 2025-07-08
Payer: MEDICARE

## 2025-07-08 VITALS
HEIGHT: 62 IN | BODY MASS INDEX: 23.55 KG/M2 | DIASTOLIC BLOOD PRESSURE: 82 MMHG | OXYGEN SATURATION: 99 % | HEART RATE: 108 BPM | SYSTOLIC BLOOD PRESSURE: 130 MMHG | TEMPERATURE: 98.3 F | WEIGHT: 128 LBS

## 2025-07-08 DIAGNOSIS — J44.1 COPD EXACERBATION (MULTI): Primary | ICD-10-CM

## 2025-07-08 DIAGNOSIS — F17.210 CIGARETTE NICOTINE DEPENDENCE WITHOUT COMPLICATION: ICD-10-CM

## 2025-07-08 PROBLEM — R35.0 URINARY FREQUENCY: Status: RESOLVED | Noted: 2023-06-16 | Resolved: 2025-07-08

## 2025-07-08 PROCEDURE — 1159F MED LIST DOCD IN RCRD: CPT | Performed by: INTERNAL MEDICINE

## 2025-07-08 PROCEDURE — 1160F RVW MEDS BY RX/DR IN RCRD: CPT | Performed by: INTERNAL MEDICINE

## 2025-07-08 PROCEDURE — 4004F PT TOBACCO SCREEN RCVD TLK: CPT | Performed by: INTERNAL MEDICINE

## 2025-07-08 PROCEDURE — 99213 OFFICE O/P EST LOW 20 MIN: CPT | Performed by: INTERNAL MEDICINE

## 2025-07-08 PROCEDURE — 3008F BODY MASS INDEX DOCD: CPT | Performed by: INTERNAL MEDICINE

## 2025-07-08 RX ORDER — PREDNISONE 20 MG/1
40 TABLET ORAL DAILY
Qty: 14 TABLET | Refills: 0 | Status: SHIPPED | OUTPATIENT
Start: 2025-07-08 | End: 2025-07-15

## 2025-07-08 ASSESSMENT — ENCOUNTER SYMPTOMS
FEVER: 0
NERVOUS/ANXIOUS: 0
SORE THROAT: 0
STRIDOR: 0
COUGH: 1
SPEECH DIFFICULTY: 0
VOICE CHANGE: 0
CONSTIPATION: 0
FACIAL ASYMMETRY: 0
SINUS PAIN: 0
HEADACHES: 0
WOUND: 0
SEIZURES: 0
JOINT SWELLING: 0
ADENOPATHY: 0
HALLUCINATIONS: 0
TREMORS: 0
CHEST TIGHTNESS: 0
UNEXPECTED WEIGHT CHANGE: 0
EYE PAIN: 0
APPETITE CHANGE: 0
COLOR CHANGE: 0
PALPITATIONS: 0
PHOTOPHOBIA: 0
WHEEZING: 0
DIARRHEA: 0
FREQUENCY: 0
SLEEP DISTURBANCE: 0
ABDOMINAL PAIN: 0
CONFUSION: 0
MYALGIAS: 0
DIZZINESS: 0
SHORTNESS OF BREATH: 1
ARTHRALGIAS: 0
NUMBNESS: 0
BACK PAIN: 0
FLANK PAIN: 0
ACTIVITY CHANGE: 0
DYSURIA: 0
NAUSEA: 0
NECK PAIN: 0
TROUBLE SWALLOWING: 0
WEAKNESS: 0
HEMATURIA: 0
FATIGUE: 0

## 2025-07-09 NOTE — PROGRESS NOTES
Subjective   Patient ID: Edna Clarke is a 64 y.o. female who presents for URI.    URI   Associated symptoms include coughing. Pertinent negatives include no abdominal pain, chest pain, congestion, diarrhea, dysuria, ear pain, headaches, nausea, neck pain, rash, sinus pain, sore throat or wheezing.    Patient presented to the office for progressive shortness of breath.   She had CT scan of chest and has mild to moderate emphysema.   Patient is still a smoker. She claimed to smoke few cigarettes a day.  She also has these symptoms ongoing from past several weeks.    Review of Systems   Constitutional:  Negative for activity change, appetite change, fatigue, fever and unexpected weight change.   HENT:  Negative for congestion, dental problem, ear discharge, ear pain, nosebleeds, postnasal drip, sinus pain, sore throat, trouble swallowing and voice change.    Eyes:  Negative for photophobia, pain and visual disturbance.   Respiratory:  Positive for cough and shortness of breath. Negative for chest tightness, wheezing and stridor.    Cardiovascular:  Negative for chest pain, palpitations and leg swelling.   Gastrointestinal:  Negative for abdominal pain, constipation, diarrhea and nausea.   Endocrine: Negative for polyuria.   Genitourinary:  Negative for decreased urine volume, dysuria, flank pain, frequency, hematuria and urgency.   Musculoskeletal:  Negative for arthralgias, back pain, gait problem, joint swelling, myalgias and neck pain.   Skin:  Negative for color change, rash and wound.   Allergic/Immunologic: Negative for environmental allergies and food allergies.   Neurological:  Negative for dizziness, tremors, seizures, syncope, facial asymmetry, speech difficulty, weakness, numbness and headaches.   Hematological:  Negative for adenopathy.   Psychiatric/Behavioral:  Negative for behavioral problems, confusion, hallucinations, sleep disturbance and suicidal ideas. The patient is not nervous/anxious.   "    Objective   /82   Pulse 108   Temp 36.8 °C (98.3 °F)   Ht 1.575 m (5' 2\")   Wt 58.1 kg (128 lb)   SpO2 99%   BMI 23.41 kg/m²     Physical Exam  Vitals and nursing note reviewed.   Constitutional:       General: She is not in acute distress.     Appearance: Normal appearance. She is not ill-appearing or toxic-appearing.   HENT:      Nose: Nose normal. No congestion.   Eyes:      Conjunctiva/sclera: Conjunctivae normal.   Cardiovascular:      Rate and Rhythm: Normal rate and regular rhythm.      Pulses: Normal pulses.      Heart sounds: Normal heart sounds.   Pulmonary:      Effort: Pulmonary effort is normal. No respiratory distress.      Breath sounds: Wheezing present. No rhonchi or rales.   Musculoskeletal:         General: No deformity. Normal range of motion.      Cervical back: Normal range of motion. No rigidity.   Skin:     General: Skin is warm.      Coloration: Skin is not jaundiced.   Neurological:      General: No focal deficit present.      Mental Status: She is alert and oriented to person, place, and time.      Coordination: Coordination normal.      Gait: Gait normal.   Psychiatric:         Mood and Affect: Mood normal.         Behavior: Behavior normal.       Assessment/Plan   Problem List Items Addressed This Visit       COPD exacerbation (Multi) - Primary    Relevant Medications    predniSONE (Deltasone) 20 mg tablet    Cigarette nicotine dependence without complication    Patient has been counseled about the smoking cessation and discussed about latest emphysema findings.          FYI- Patient has been discussed about emphysema and the role of cigarette smoking. She said she is going to work on cessation of smoking.  "

## 2025-07-09 NOTE — ASSESSMENT & PLAN NOTE
Patient has been counseled about the smoking cessation and discussed about latest emphysema findings.

## 2025-08-07 DIAGNOSIS — G47.09 OTHER INSOMNIA: ICD-10-CM

## 2025-08-07 RX ORDER — TRAZODONE HYDROCHLORIDE 100 MG/1
100 TABLET ORAL NIGHTLY
Qty: 90 TABLET | Refills: 0 | Status: SHIPPED | OUTPATIENT
Start: 2025-08-07

## 2025-12-15 ENCOUNTER — APPOINTMENT (OUTPATIENT)
Dept: PRIMARY CARE | Facility: CLINIC | Age: 65
End: 2025-12-15
Payer: COMMERCIAL